# Patient Record
Sex: FEMALE | Race: NATIVE HAWAIIAN OR OTHER PACIFIC ISLANDER | NOT HISPANIC OR LATINO | Employment: FULL TIME | URBAN - METROPOLITAN AREA
[De-identification: names, ages, dates, MRNs, and addresses within clinical notes are randomized per-mention and may not be internally consistent; named-entity substitution may affect disease eponyms.]

---

## 2018-11-05 ENCOUNTER — APPOINTMENT (OUTPATIENT)
Dept: RADIOLOGY | Facility: MEDICAL CENTER | Age: 43
DRG: 812 | End: 2018-11-05
Attending: PHYSICAL MEDICINE & REHABILITATION
Payer: COMMERCIAL

## 2018-11-05 ENCOUNTER — HOSPITAL ENCOUNTER (INPATIENT)
Facility: MEDICAL CENTER | Age: 43
LOS: 2 days | DRG: 812 | End: 2018-11-07
Attending: EMERGENCY MEDICINE | Admitting: INTERNAL MEDICINE
Payer: COMMERCIAL

## 2018-11-05 DIAGNOSIS — N92.1 MENORRHAGIA WITH IRREGULAR CYCLE: ICD-10-CM

## 2018-11-05 DIAGNOSIS — D64.9 SYMPTOMATIC ANEMIA: ICD-10-CM

## 2018-11-05 LAB
ABO GROUP BLD: NORMAL
ABO GROUP BLD: NORMAL
ALBUMIN SERPL BCP-MCNC: 4 G/DL (ref 3.2–4.9)
ALBUMIN/GLOB SERPL: 1.5 G/DL
ALP SERPL-CCNC: 55 U/L (ref 30–99)
ALT SERPL-CCNC: 16 U/L (ref 2–50)
ANION GAP SERPL CALC-SCNC: 6 MMOL/L (ref 0–11.9)
ANISOCYTOSIS BLD QL SMEAR: ABNORMAL
APPEARANCE UR: ABNORMAL
AST SERPL-CCNC: 14 U/L (ref 12–45)
BACTERIA #/AREA URNS HPF: NEGATIVE /HPF
BACTERIA GENITAL QL WET PREP: NORMAL
BARCODED ABORH UBTYP: 5100
BARCODED ABORH UBTYP: 5100
BARCODED ABORH UBTYP: 9500
BARCODED PRD CODE UBPRD: NORMAL
BARCODED UNIT NUM UBUNT: NORMAL
BASOPHILS # BLD AUTO: 0.3 % (ref 0–1.8)
BASOPHILS # BLD: 0.02 K/UL (ref 0–0.12)
BILIRUB SERPL-MCNC: 0.3 MG/DL (ref 0.1–1.5)
BILIRUB UR QL STRIP.AUTO: NEGATIVE
BLD GP AB SCN SERPL QL: NORMAL
BUN SERPL-MCNC: 13 MG/DL (ref 8–22)
CALCIUM SERPL-MCNC: 8.6 MG/DL (ref 8.5–10.5)
CHLORIDE SERPL-SCNC: 105 MMOL/L (ref 96–112)
CO2 SERPL-SCNC: 27 MMOL/L (ref 20–33)
COLOR UR: YELLOW
COMMENT 1642: NORMAL
COMPONENT R 8504R: NORMAL
CREAT SERPL-MCNC: 0.72 MG/DL (ref 0.5–1.4)
EOSINOPHIL # BLD AUTO: 0.1 K/UL (ref 0–0.51)
EOSINOPHIL NFR BLD: 1.7 % (ref 0–6.9)
EPI CELLS #/AREA URNS HPF: NEGATIVE /HPF
ERYTHROCYTE [DISTWIDTH] IN BLOOD BY AUTOMATED COUNT: 44.1 FL (ref 35.9–50)
GIANT PLATELETS BLD QL SMEAR: NORMAL
GLOBULIN SER CALC-MCNC: 2.6 G/DL (ref 1.9–3.5)
GLUCOSE SERPL-MCNC: 115 MG/DL (ref 65–99)
GLUCOSE UR STRIP.AUTO-MCNC: NEGATIVE MG/DL
HCG SERPL QL: NEGATIVE
HCT VFR BLD AUTO: 17.6 % (ref 37–47)
HGB BLD-MCNC: 4.8 G/DL (ref 12–16)
HYALINE CASTS #/AREA URNS LPF: NORMAL /LPF
HYPOCHROMIA BLD QL SMEAR: ABNORMAL
IMM GRANULOCYTES # BLD AUTO: 0.05 K/UL (ref 0–0.11)
IMM GRANULOCYTES NFR BLD AUTO: 0.9 % (ref 0–0.9)
INR PPP: 1.08 (ref 0.87–1.13)
KETONES UR STRIP.AUTO-MCNC: NEGATIVE MG/DL
LEUKOCYTE ESTERASE UR QL STRIP.AUTO: ABNORMAL
LYMPHOCYTES # BLD AUTO: 1.49 K/UL (ref 1–4.8)
LYMPHOCYTES NFR BLD: 25.5 % (ref 22–41)
MACROCYTES BLD QL SMEAR: ABNORMAL
MCH RBC QN AUTO: 18.7 PG (ref 27–33)
MCHC RBC AUTO-ENTMCNC: 27.7 G/DL (ref 33.6–35)
MCV RBC AUTO: 67.6 FL (ref 81.4–97.8)
MICRO URNS: ABNORMAL
MICROCYTES BLD QL SMEAR: ABNORMAL
MONOCYTES # BLD AUTO: 0.57 K/UL (ref 0–0.85)
MONOCYTES NFR BLD AUTO: 9.8 % (ref 0–13.4)
MORPHOLOGY BLD-IMP: NORMAL
NEUTROPHILS # BLD AUTO: 3.61 K/UL (ref 2–7.15)
NEUTROPHILS NFR BLD: 61.8 % (ref 44–72)
NITRITE UR QL STRIP.AUTO: NEGATIVE
NRBC # BLD AUTO: 0.06 K/UL
NRBC BLD-RTO: 1 /100 WBC
OVALOCYTES BLD QL SMEAR: NORMAL
PH UR STRIP.AUTO: 8.5 [PH]
PLATELET # BLD AUTO: 257 K/UL (ref 164–446)
PLATELET BLD QL SMEAR: NORMAL
PMV BLD AUTO: 9.9 FL (ref 9–12.9)
POIKILOCYTOSIS BLD QL SMEAR: NORMAL
POLYCHROMASIA BLD QL SMEAR: NORMAL
POTASSIUM SERPL-SCNC: 3.3 MMOL/L (ref 3.6–5.5)
PRODUCT TYPE UPROD: NORMAL
PROT SERPL-MCNC: 6.6 G/DL (ref 6–8.2)
PROT UR QL STRIP: NEGATIVE MG/DL
PROTHROMBIN TIME: 14.1 SEC (ref 12–14.6)
RBC # BLD AUTO: 2.62 M/UL (ref 4.2–5.4)
RBC # URNS HPF: NORMAL /HPF
RBC BLD AUTO: PRESENT
RBC UR QL AUTO: NEGATIVE
RH BLD: NORMAL
RH BLD: NORMAL
SCHISTOCYTES BLD QL SMEAR: NORMAL
SIGNIFICANT IND 70042: NORMAL
SITE SITE: NORMAL
SODIUM SERPL-SCNC: 138 MMOL/L (ref 135–145)
SOURCE SOURCE: NORMAL
SP GR UR STRIP.AUTO: 1.02
UNIT STATUS USTAT: NORMAL
UROBILINOGEN UR STRIP.AUTO-MCNC: 0.2 MG/DL
WBC # BLD AUTO: 5.8 K/UL (ref 4.8–10.8)
WBC #/AREA URNS HPF: NORMAL /HPF

## 2018-11-05 PROCEDURE — 86901 BLOOD TYPING SEROLOGIC RH(D): CPT

## 2018-11-05 PROCEDURE — 30233N1 TRANSFUSION OF NONAUTOLOGOUS RED BLOOD CELLS INTO PERIPHERAL VEIN, PERCUTANEOUS APPROACH: ICD-10-PCS | Performed by: INTERNAL MEDICINE

## 2018-11-05 PROCEDURE — 87491 CHLMYD TRACH DNA AMP PROBE: CPT

## 2018-11-05 PROCEDURE — 700105 HCHG RX REV CODE 258: Performed by: EMERGENCY MEDICINE

## 2018-11-05 PROCEDURE — 93010 ELECTROCARDIOGRAM REPORT: CPT | Performed by: INTERNAL MEDICINE

## 2018-11-05 PROCEDURE — 93005 ELECTROCARDIOGRAM TRACING: CPT | Performed by: PHYSICAL MEDICINE & REHABILITATION

## 2018-11-05 PROCEDURE — 86900 BLOOD TYPING SEROLOGIC ABO: CPT

## 2018-11-05 PROCEDURE — 80053 COMPREHEN METABOLIC PANEL: CPT

## 2018-11-05 PROCEDURE — 81001 URINALYSIS AUTO W/SCOPE: CPT

## 2018-11-05 PROCEDURE — 85025 COMPLETE CBC W/AUTO DIFF WBC: CPT

## 2018-11-05 PROCEDURE — 84703 CHORIONIC GONADOTROPIN ASSAY: CPT

## 2018-11-05 PROCEDURE — 36430 TRANSFUSION BLD/BLD COMPNT: CPT

## 2018-11-05 PROCEDURE — 85610 PROTHROMBIN TIME: CPT

## 2018-11-05 PROCEDURE — 86923 COMPATIBILITY TEST ELECTRIC: CPT

## 2018-11-05 PROCEDURE — 87591 N.GONORRHOEAE DNA AMP PROB: CPT

## 2018-11-05 PROCEDURE — 94760 N-INVAS EAR/PLS OXIMETRY 1: CPT

## 2018-11-05 PROCEDURE — P9016 RBC LEUKOCYTES REDUCED: HCPCS

## 2018-11-05 PROCEDURE — 99285 EMERGENCY DEPT VISIT HI MDM: CPT

## 2018-11-05 PROCEDURE — 86850 RBC ANTIBODY SCREEN: CPT

## 2018-11-05 PROCEDURE — 770020 HCHG ROOM/CARE - TELE (206)

## 2018-11-05 RX ORDER — SODIUM CHLORIDE 9 MG/ML
INJECTION, SOLUTION INTRAVENOUS CONTINUOUS
Status: DISCONTINUED | OUTPATIENT
Start: 2018-11-05 | End: 2018-11-05

## 2018-11-05 RX ORDER — ACETAMINOPHEN 325 MG/1
650 TABLET ORAL EVERY 6 HOURS PRN
Status: DISCONTINUED | OUTPATIENT
Start: 2018-11-05 | End: 2018-11-07 | Stop reason: HOSPADM

## 2018-11-05 RX ORDER — BISACODYL 10 MG
10 SUPPOSITORY, RECTAL RECTAL
Status: DISCONTINUED | OUTPATIENT
Start: 2018-11-05 | End: 2018-11-07 | Stop reason: HOSPADM

## 2018-11-05 RX ORDER — GUAIFENESIN/DEXTROMETHORPHAN 100-10MG/5
10 SYRUP ORAL EVERY 6 HOURS PRN
Status: DISCONTINUED | OUTPATIENT
Start: 2018-11-05 | End: 2018-11-07 | Stop reason: HOSPADM

## 2018-11-05 RX ORDER — POLYETHYLENE GLYCOL 3350 17 G/17G
1 POWDER, FOR SOLUTION ORAL
Status: DISCONTINUED | OUTPATIENT
Start: 2018-11-05 | End: 2018-11-07 | Stop reason: HOSPADM

## 2018-11-05 RX ORDER — METRONIDAZOLE 500 MG/1
500 TABLET ORAL EVERY 12 HOURS
Status: DISCONTINUED | OUTPATIENT
Start: 2018-11-05 | End: 2018-11-06

## 2018-11-05 RX ORDER — AMOXICILLIN 250 MG
2 CAPSULE ORAL 2 TIMES DAILY
Status: DISCONTINUED | OUTPATIENT
Start: 2018-11-05 | End: 2018-11-07 | Stop reason: HOSPADM

## 2018-11-05 RX ADMIN — SODIUM CHLORIDE: 9 INJECTION, SOLUTION INTRAVENOUS at 20:15

## 2018-11-05 ASSESSMENT — ENCOUNTER SYMPTOMS
BLURRED VISION: 0
DIAPHORESIS: 0
HEMOPTYSIS: 0
PND: 0
PALPITATIONS: 0
FOCAL WEAKNESS: 0
SEIZURES: 0
HEADACHES: 0
DOUBLE VISION: 0
COUGH: 0
FEVER: 0
FALLS: 0
CONSTIPATION: 0
FOCAL WEAKNESS: 1
BRUISES/BLEEDS EASILY: 0
WEIGHT LOSS: 0
SHORTNESS OF BREATH: 1
CHILLS: 0
SHORTNESS OF BREATH: 0
LOSS OF CONSCIOUSNESS: 0
BLOOD IN STOOL: 0
SPEECH CHANGE: 0
WEAKNESS: 1
NAUSEA: 0
DIARRHEA: 0
TINGLING: 0
DIZZINESS: 0
WHEEZING: 0
CLAUDICATION: 0
SENSORY CHANGE: 0
VOMITING: 0
SPUTUM PRODUCTION: 0
ABDOMINAL PAIN: 0
ORTHOPNEA: 0
DEPRESSION: 0

## 2018-11-05 ASSESSMENT — LIFESTYLE VARIABLES
ON A TYPICAL DAY WHEN YOU DRINK ALCOHOL HOW MANY DRINKS DO YOU HAVE: 1
AVERAGE NUMBER OF DAYS PER WEEK YOU HAVE A DRINK CONTAINING ALCOHOL: 2
ALCOHOL_USE: YES
TOTAL SCORE: 0
TOTAL SCORE: 0
HAVE PEOPLE ANNOYED YOU BY CRITICIZING YOUR DRINKING: NO
CONSUMPTION TOTAL: NEGATIVE
TOTAL SCORE: 0
EVER_SMOKED: YES
EVER FELT BAD OR GUILTY ABOUT YOUR DRINKING: NO
EVER HAD A DRINK FIRST THING IN THE MORNING TO STEADY YOUR NERVES TO GET RID OF A HANGOVER: NO
HAVE YOU EVER FELT YOU SHOULD CUT DOWN ON YOUR DRINKING: NO
HOW MANY TIMES IN THE PAST YEAR HAVE YOU HAD 5 OR MORE DRINKS IN A DAY: 0
EVER_SMOKED: YES

## 2018-11-05 ASSESSMENT — COPD QUESTIONNAIRES
HAVE YOU SMOKED AT LEAST 100 CIGARETTES IN YOUR ENTIRE LIFE: YES
COPD SCREENING SCORE: 3
IN THE PAST 12 MONTHS DO YOU DO LESS THAN YOU USED TO BECAUSE OF YOUR BREATHING PROBLEMS: DISAGREE/UNSURE
HAVE YOU SMOKED AT LEAST 100 CIGARETTES IN YOUR ENTIRE LIFE: YES
DO YOU EVER COUGH UP ANY MUCUS OR PHLEGM?: NO/ONLY WITH OCCASIONAL COLDS OR INFECTIONS
COPD SCREENING SCORE: 3
DO YOU EVER COUGH UP ANY MUCUS OR PHLEGM?: NO/ONLY WITH OCCASIONAL COLDS OR INFECTIONS
DURING THE PAST 4 WEEKS HOW MUCH DID YOU FEEL SHORT OF BREATH: SOME OF THE TIME
DURING THE PAST 4 WEEKS HOW MUCH DID YOU FEEL SHORT OF BREATH: SOME OF THE TIME

## 2018-11-05 ASSESSMENT — COGNITIVE AND FUNCTIONAL STATUS - GENERAL
SUGGESTED CMS G CODE MODIFIER MOBILITY: CH
SUGGESTED CMS G CODE MODIFIER DAILY ACTIVITY: CH
DAILY ACTIVITIY SCORE: 24
MOBILITY SCORE: 24

## 2018-11-05 ASSESSMENT — PATIENT HEALTH QUESTIONNAIRE - PHQ9
1. LITTLE INTEREST OR PLEASURE IN DOING THINGS: NOT AT ALL
2. FEELING DOWN, DEPRESSED, IRRITABLE, OR HOPELESS: NOT AT ALL
SUM OF ALL RESPONSES TO PHQ9 QUESTIONS 1 AND 2: 0

## 2018-11-05 ASSESSMENT — PAIN SCALES - GENERAL
PAINLEVEL_OUTOF10: 0
PAINLEVEL_OUTOF10: 0

## 2018-11-05 NOTE — LETTER
[facility]  TELEMET[employer] [place] [address]F [facility] [place]  [address] [facility] [address]  [phone]     November 7, 2018    Patient: Sruthi Jenkins   YOB: 1975   Date of Visit: 11/5/2018       To Whom It May Concern:    Sruthi Jenkins was seen and treated in our department on 11/5/2018. She was discharged on 11/07/2018. She may return to work after few days rest on 11/14/2018.     Sincerely,     Kale Pierre M.D.

## 2018-11-06 ENCOUNTER — APPOINTMENT (OUTPATIENT)
Dept: RADIOLOGY | Facility: MEDICAL CENTER | Age: 43
DRG: 812 | End: 2018-11-06
Attending: PHYSICAL MEDICINE & REHABILITATION
Payer: COMMERCIAL

## 2018-11-06 ENCOUNTER — APPOINTMENT (OUTPATIENT)
Dept: RADIOLOGY | Facility: REHABILITATION | Age: 43
DRG: 812 | End: 2018-11-06
Attending: PHYSICAL MEDICINE & REHABILITATION
Payer: COMMERCIAL

## 2018-11-06 ENCOUNTER — APPOINTMENT (OUTPATIENT)
Dept: CARDIOLOGY | Facility: MEDICAL CENTER | Age: 43
DRG: 812 | End: 2018-11-06
Attending: STUDENT IN AN ORGANIZED HEALTH CARE EDUCATION/TRAINING PROGRAM
Payer: COMMERCIAL

## 2018-11-06 PROBLEM — E87.6 HYPOKALEMIA: Status: RESOLVED | Noted: 2018-11-06 | Resolved: 2018-11-06

## 2018-11-06 PROBLEM — E87.6 HYPOKALEMIA: Status: ACTIVE | Noted: 2018-11-06

## 2018-11-06 PROBLEM — N93.9 ABNORMAL UTERINE BLEEDING (AUB): Status: ACTIVE | Noted: 2018-11-06

## 2018-11-06 PROBLEM — A59.9 TRICHOMONAS VAGINALIS INFECTION: Status: ACTIVE | Noted: 2018-11-06

## 2018-11-06 PROBLEM — D64.9 ANEMIA: Status: ACTIVE | Noted: 2018-11-06

## 2018-11-06 LAB
ALBUMIN SERPL BCP-MCNC: 3.5 G/DL (ref 3.2–4.9)
ALBUMIN/GLOB SERPL: 1.5 G/DL
ALP SERPL-CCNC: 47 U/L (ref 30–99)
ALT SERPL-CCNC: 13 U/L (ref 2–50)
ANION GAP SERPL CALC-SCNC: 7 MMOL/L (ref 0–11.9)
AST SERPL-CCNC: 15 U/L (ref 12–45)
BASOPHILS # BLD AUTO: 0.6 % (ref 0–1.8)
BASOPHILS # BLD AUTO: 0.7 % (ref 0–1.8)
BASOPHILS # BLD AUTO: 0.9 % (ref 0–1.8)
BASOPHILS # BLD: 0.04 K/UL (ref 0–0.12)
BASOPHILS # BLD: 0.04 K/UL (ref 0–0.12)
BASOPHILS # BLD: 0.06 K/UL (ref 0–0.12)
BILIRUB SERPL-MCNC: 1.4 MG/DL (ref 0.1–1.5)
BNP SERPL-MCNC: 50 PG/ML (ref 0–100)
BUN SERPL-MCNC: 11 MG/DL (ref 8–22)
C TRACH DNA SPEC QL NAA+PROBE: NEGATIVE
CALCIUM SERPL-MCNC: 8 MG/DL (ref 8.5–10.5)
CHLORIDE SERPL-SCNC: 109 MMOL/L (ref 96–112)
CHOLEST SERPL-MCNC: 110 MG/DL (ref 100–199)
CO2 SERPL-SCNC: 23 MMOL/L (ref 20–33)
CREAT SERPL-MCNC: 0.65 MG/DL (ref 0.5–1.4)
EKG IMPRESSION: NORMAL
EOSINOPHIL # BLD AUTO: 0.08 K/UL (ref 0–0.51)
EOSINOPHIL # BLD AUTO: 0.1 K/UL (ref 0–0.51)
EOSINOPHIL # BLD AUTO: 0.14 K/UL (ref 0–0.51)
EOSINOPHIL NFR BLD: 1.4 % (ref 0–6.9)
EOSINOPHIL NFR BLD: 1.4 % (ref 0–6.9)
EOSINOPHIL NFR BLD: 2.1 % (ref 0–6.9)
ERYTHROCYTE [DISTWIDTH] IN BLOOD BY AUTOMATED COUNT: 50.1 FL (ref 35.9–50)
ERYTHROCYTE [DISTWIDTH] IN BLOOD BY AUTOMATED COUNT: 52.9 FL (ref 35.9–50)
ERYTHROCYTE [DISTWIDTH] IN BLOOD BY AUTOMATED COUNT: 58 FL (ref 35.9–50)
FERRITIN SERPL-MCNC: 5.1 NG/ML (ref 10–291)
GLOBULIN SER CALC-MCNC: 2.3 G/DL (ref 1.9–3.5)
GLUCOSE SERPL-MCNC: 107 MG/DL (ref 65–99)
HCT VFR BLD AUTO: 19 % (ref 37–47)
HCT VFR BLD AUTO: 21.6 % (ref 37–47)
HCT VFR BLD AUTO: 26.4 % (ref 37–47)
HDLC SERPL-MCNC: 41 MG/DL
HGB BLD-MCNC: 5.5 G/DL (ref 12–16)
HGB BLD-MCNC: 6.5 G/DL (ref 12–16)
HGB BLD-MCNC: 8.1 G/DL (ref 12–16)
HGB RETIC QN AUTO: 15.2 PG/CELL (ref 29–35)
IMM GRANULOCYTES # BLD AUTO: 0.04 K/UL (ref 0–0.11)
IMM GRANULOCYTES # BLD AUTO: 0.06 K/UL (ref 0–0.11)
IMM GRANULOCYTES # BLD AUTO: 0.08 K/UL (ref 0–0.11)
IMM GRANULOCYTES NFR BLD AUTO: 0.7 % (ref 0–0.9)
IMM GRANULOCYTES NFR BLD AUTO: 0.9 % (ref 0–0.9)
IMM GRANULOCYTES NFR BLD AUTO: 1.1 % (ref 0–0.9)
IMM RETICS NFR: 29.6 % (ref 9.3–17.4)
IRON SATN MFR SERPL: 62 % (ref 15–55)
IRON SERPL-MCNC: 268 UG/DL (ref 40–170)
LDH SERPL L TO P-CCNC: 168 U/L (ref 107–266)
LDLC SERPL CALC-MCNC: 59 MG/DL
LH SERPL-ACNC: 5 IU/L
LYMPHOCYTES # BLD AUTO: 1.42 K/UL (ref 1–4.8)
LYMPHOCYTES # BLD AUTO: 1.55 K/UL (ref 1–4.8)
LYMPHOCYTES # BLD AUTO: 1.66 K/UL (ref 1–4.8)
LYMPHOCYTES NFR BLD: 23.2 % (ref 22–41)
LYMPHOCYTES NFR BLD: 23.3 % (ref 22–41)
LYMPHOCYTES NFR BLD: 25.1 % (ref 22–41)
MAGNESIUM SERPL-MCNC: 1.9 MG/DL (ref 1.5–2.5)
MCH RBC QN AUTO: 20.4 PG (ref 27–33)
MCH RBC QN AUTO: 21.4 PG (ref 27–33)
MCH RBC QN AUTO: 22.5 PG (ref 27–33)
MCHC RBC AUTO-ENTMCNC: 28.9 G/DL (ref 33.6–35)
MCHC RBC AUTO-ENTMCNC: 30.1 G/DL (ref 33.6–35)
MCHC RBC AUTO-ENTMCNC: 30.7 G/DL (ref 33.6–35)
MCV RBC AUTO: 70.4 FL (ref 81.4–97.8)
MCV RBC AUTO: 71.1 FL (ref 81.4–97.8)
MCV RBC AUTO: 73.3 FL (ref 81.4–97.8)
MONOCYTES # BLD AUTO: 0.49 K/UL (ref 0–0.85)
MONOCYTES # BLD AUTO: 0.49 K/UL (ref 0–0.85)
MONOCYTES # BLD AUTO: 0.75 K/UL (ref 0–0.85)
MONOCYTES NFR BLD AUTO: 11.3 % (ref 0–13.4)
MONOCYTES NFR BLD AUTO: 6.8 % (ref 0–13.4)
MONOCYTES NFR BLD AUTO: 8.7 % (ref 0–13.4)
MORPHOLOGY BLD-IMP: NORMAL
N GONORRHOEA DNA SPEC QL NAA+PROBE: NEGATIVE
NEUTROPHILS # BLD AUTO: 3.58 K/UL (ref 2–7.15)
NEUTROPHILS # BLD AUTO: 4.08 K/UL (ref 2–7.15)
NEUTROPHILS # BLD AUTO: 4.09 K/UL (ref 2–7.15)
NEUTROPHILS NFR BLD: 61.5 % (ref 44–72)
NEUTROPHILS NFR BLD: 63.4 % (ref 44–72)
NEUTROPHILS NFR BLD: 66.9 % (ref 44–72)
NRBC # BLD AUTO: 0.05 K/UL
NRBC # BLD AUTO: 0.09 K/UL
NRBC # BLD AUTO: 0.11 K/UL
NRBC BLD-RTO: 0.9 /100 WBC
NRBC BLD-RTO: 1.3 /100 WBC
NRBC BLD-RTO: 1.7 /100 WBC
PLATELET # BLD AUTO: 212 K/UL (ref 164–446)
PLATELET # BLD AUTO: 227 K/UL (ref 164–446)
PLATELET # BLD AUTO: 233 K/UL (ref 164–446)
PMV BLD AUTO: 10 FL (ref 9–12.9)
PMV BLD AUTO: 10.2 FL (ref 9–12.9)
PMV BLD AUTO: 9.4 FL (ref 9–12.9)
POTASSIUM SERPL-SCNC: 3.7 MMOL/L (ref 3.6–5.5)
PROLACTIN SERPL-MCNC: 23.46 NG/ML (ref 2.8–26)
PROT SERPL-MCNC: 5.8 G/DL (ref 6–8.2)
RBC # BLD AUTO: 2.7 M/UL (ref 4.2–5.4)
RBC # BLD AUTO: 3.04 M/UL (ref 4.2–5.4)
RBC # BLD AUTO: 3.6 M/UL (ref 4.2–5.4)
RETICS # AUTO: 0.11 M/UL (ref 0.04–0.06)
RETICS/RBC NFR: 3.9 % (ref 0.8–2.1)
SODIUM SERPL-SCNC: 139 MMOL/L (ref 135–145)
SPECIMEN SOURCE: NORMAL
TIBC SERPL-MCNC: 433 UG/DL (ref 250–450)
TRIGL SERPL-MCNC: 51 MG/DL (ref 0–149)
TSH SERPL DL<=0.005 MIU/L-ACNC: 2.82 UIU/ML (ref 0.38–5.33)
WBC # BLD AUTO: 5.7 K/UL (ref 4.8–10.8)
WBC # BLD AUTO: 6.7 K/UL (ref 4.8–10.8)
WBC # BLD AUTO: 7.2 K/UL (ref 4.8–10.8)

## 2018-11-06 PROCEDURE — 83880 ASSAY OF NATRIURETIC PEPTIDE: CPT

## 2018-11-06 PROCEDURE — 85046 RETICYTE/HGB CONCENTRATE: CPT

## 2018-11-06 PROCEDURE — P9016 RBC LEUKOCYTES REDUCED: HCPCS

## 2018-11-06 PROCEDURE — 83550 IRON BINDING TEST: CPT

## 2018-11-06 PROCEDURE — 700111 HCHG RX REV CODE 636 W/ 250 OVERRIDE (IP): Performed by: STUDENT IN AN ORGANIZED HEALTH CARE EDUCATION/TRAINING PROGRAM

## 2018-11-06 PROCEDURE — 36415 COLL VENOUS BLD VENIPUNCTURE: CPT

## 2018-11-06 PROCEDURE — 83735 ASSAY OF MAGNESIUM: CPT

## 2018-11-06 PROCEDURE — 93306 TTE W/DOPPLER COMPLETE: CPT

## 2018-11-06 PROCEDURE — 83615 LACTATE (LD) (LDH) ENZYME: CPT

## 2018-11-06 PROCEDURE — 76856 US EXAM PELVIC COMPLETE: CPT

## 2018-11-06 PROCEDURE — 86923 COMPATIBILITY TEST ELECTRIC: CPT | Mod: 91

## 2018-11-06 PROCEDURE — 700105 HCHG RX REV CODE 258: Performed by: STUDENT IN AN ORGANIZED HEALTH CARE EDUCATION/TRAINING PROGRAM

## 2018-11-06 PROCEDURE — 700102 HCHG RX REV CODE 250 W/ 637 OVERRIDE(OP): Performed by: PHYSICAL MEDICINE & REHABILITATION

## 2018-11-06 PROCEDURE — 36430 TRANSFUSION BLD/BLD COMPNT: CPT

## 2018-11-06 PROCEDURE — 84443 ASSAY THYROID STIM HORMONE: CPT

## 2018-11-06 PROCEDURE — 82728 ASSAY OF FERRITIN: CPT

## 2018-11-06 PROCEDURE — 84146 ASSAY OF PROLACTIN: CPT

## 2018-11-06 PROCEDURE — A9270 NON-COVERED ITEM OR SERVICE: HCPCS | Performed by: PHYSICAL MEDICINE & REHABILITATION

## 2018-11-06 PROCEDURE — 770020 HCHG ROOM/CARE - TELE (206)

## 2018-11-06 PROCEDURE — 93306 TTE W/DOPPLER COMPLETE: CPT | Mod: 26 | Performed by: INTERNAL MEDICINE

## 2018-11-06 PROCEDURE — 80053 COMPREHEN METABOLIC PANEL: CPT

## 2018-11-06 PROCEDURE — 700102 HCHG RX REV CODE 250 W/ 637 OVERRIDE(OP): Performed by: STUDENT IN AN ORGANIZED HEALTH CARE EDUCATION/TRAINING PROGRAM

## 2018-11-06 PROCEDURE — 83002 ASSAY OF GONADOTROPIN (LH): CPT

## 2018-11-06 PROCEDURE — 99223 1ST HOSP IP/OBS HIGH 75: CPT | Mod: GC | Performed by: INTERNAL MEDICINE

## 2018-11-06 PROCEDURE — 83036 HEMOGLOBIN GLYCOSYLATED A1C: CPT

## 2018-11-06 PROCEDURE — 80061 LIPID PANEL: CPT

## 2018-11-06 PROCEDURE — A9270 NON-COVERED ITEM OR SERVICE: HCPCS | Performed by: STUDENT IN AN ORGANIZED HEALTH CARE EDUCATION/TRAINING PROGRAM

## 2018-11-06 PROCEDURE — 83540 ASSAY OF IRON: CPT

## 2018-11-06 PROCEDURE — 85025 COMPLETE CBC W/AUTO DIFF WBC: CPT

## 2018-11-06 RX ORDER — METRONIDAZOLE 500 MG/1
2000 TABLET ORAL ONCE
Status: COMPLETED | OUTPATIENT
Start: 2018-11-06 | End: 2018-11-06

## 2018-11-06 RX ORDER — FUROSEMIDE 10 MG/ML
20 INJECTION INTRAMUSCULAR; INTRAVENOUS
Status: COMPLETED | OUTPATIENT
Start: 2018-11-06 | End: 2018-11-06

## 2018-11-06 RX ORDER — CALCIUM CARBONATE 500 MG/1
500 TABLET, CHEWABLE ORAL DAILY
Status: DISCONTINUED | OUTPATIENT
Start: 2018-11-06 | End: 2018-11-07 | Stop reason: HOSPADM

## 2018-11-06 RX ORDER — SODIUM CHLORIDE 9 MG/ML
INJECTION, SOLUTION INTRAVENOUS PRN
Status: DISCONTINUED | OUTPATIENT
Start: 2018-11-06 | End: 2018-11-07 | Stop reason: HOSPADM

## 2018-11-06 RX ORDER — FUROSEMIDE 10 MG/ML
40 INJECTION INTRAMUSCULAR; INTRAVENOUS ONCE
Status: COMPLETED | OUTPATIENT
Start: 2018-11-06 | End: 2018-11-06

## 2018-11-06 RX ADMIN — FUROSEMIDE 20 MG: 10 INJECTION, SOLUTION INTRAMUSCULAR; INTRAVENOUS at 15:38

## 2018-11-06 RX ADMIN — FUROSEMIDE 40 MG: 10 INJECTION, SOLUTION INTRAMUSCULAR; INTRAVENOUS at 11:46

## 2018-11-06 RX ADMIN — ANTACID TABLETS 500 MG: 500 TABLET, CHEWABLE ORAL at 17:18

## 2018-11-06 RX ADMIN — METRONIDAZOLE 2000 MG: 500 TABLET ORAL at 13:19

## 2018-11-06 RX ADMIN — METRONIDAZOLE 500 MG: 500 TABLET ORAL at 05:38

## 2018-11-06 RX ADMIN — SODIUM CHLORIDE 1000 ML: 9 INJECTION, SOLUTION INTRAVENOUS at 11:51

## 2018-11-06 ASSESSMENT — ENCOUNTER SYMPTOMS
DEPRESSION: 0
ORTHOPNEA: 0
DIZZINESS: 0
TREMORS: 0
WEIGHT LOSS: 0
MEMORY LOSS: 0
SPUTUM PRODUCTION: 0
POLYDIPSIA: 0
HEADACHES: 0
PND: 0
FEVER: 0
BRUISES/BLEEDS EASILY: 0
SEIZURES: 0
FLANK PAIN: 0
NAUSEA: 0
DIAPHORESIS: 0
MYALGIAS: 0
NERVOUS/ANXIOUS: 0
COUGH: 0
HEARTBURN: 0
PALPITATIONS: 0
HEMOPTYSIS: 0
WHEEZING: 0
VOMITING: 0
ABDOMINAL PAIN: 0
SENSORY CHANGE: 0
CHILLS: 0
FOCAL WEAKNESS: 0
CONSTIPATION: 0
TINGLING: 0
WEAKNESS: 1
HALLUCINATIONS: 0
DIARRHEA: 0
BACK PAIN: 0
SPEECH CHANGE: 0
SHORTNESS OF BREATH: 0
NECK PAIN: 0
CLAUDICATION: 0
INSOMNIA: 0
EYES NEGATIVE: 1
FALLS: 0
LOSS OF CONSCIOUSNESS: 0
BLOOD IN STOOL: 0

## 2018-11-06 ASSESSMENT — PAIN SCALES - GENERAL
PAINLEVEL_OUTOF10: 0

## 2018-11-06 ASSESSMENT — LIFESTYLE VARIABLES: SUBSTANCE_ABUSE: 0

## 2018-11-06 NOTE — ED PROVIDER NOTES
ED Provider Note    Means of arrival: private vehicle  History obtained from: patient   History limited by: none    CHIEF COMPLAINT  Chief Complaint   Patient presents with   • Sent by MD   • Weakness     generalized weakness   • Abnormal Labs     H&H 5&16       HPI  Sruthi Jenkins is a 43 y.o. female who presents to the Emergency Department for weakness and anemia.  Patient reports that she is a G5, P5 with her youngest child being 13 years old.  Over the last month she has had a menstrual cycle where she was bleeding very heavily in for prolonged when compared to previously.  Prior to this last month she had normal periods.  Patient reports that she stopped bleeding a few days ago but has still had persistent generalized weakness and fatigue.  Last PAP smear was 11 years ago and was normal.  Patient has no history of instrumentation to the uterus.  She is otherwise healthy.  She went to urgent care where she was found to have a hemoglobin of around 5 and sent here for further evaluation.    REVIEW OF SYSTEMS  Review of Systems   Constitutional: Positive for malaise/fatigue. Negative for chills and fever.   Respiratory: Negative for shortness of breath.    Cardiovascular: Negative for chest pain.   Gastrointestinal: Negative for abdominal pain, nausea and vomiting.   Neurological: Positive for focal weakness.       PAST MEDICAL HISTORY   None    SURGICAL HISTORY  patient denies any surgical history    SOCIAL HISTORY  Social History   Substance Use Topics   • Smoking status: Never Smoker   • Smokeless tobacco: Never Used   • Alcohol use No      History   Drug Use No       FAMILY HISTORY  History reviewed. No pertinent family history.    CURRENT MEDICATIONS  Home Medications     Reviewed by Brandy Diaz R.N. (Registered Nurse) on 11/05/18 at 2044  Med List Status: Partial   Medication Last Dose Status   IRON PO 11/5/2018 Active                ALLERGIES  No Known Allergies    PHYSICAL EXAM  VITAL SIGNS: BP  "124/77   Pulse 98   Temp 36.8 °C (98.3 °F) (Temporal)   Resp (!) 26   Ht 1.626 m (5' 4\")   Wt 107.5 kg (236 lb 15.9 oz)   LMP  (Within Days)   SpO2 100%   BMI 40.68 kg/m²   Vitals reviewed by myself.  Physical Exam   Constitutional: She is oriented to person, place, and time and well-developed, well-nourished, and in no distress. No distress.   HENT:   Head: Normocephalic and atraumatic.   Eyes: EOM are normal.   + for conjunctiva pallor   Neck: Normal range of motion.   Cardiovascular: Normal rate, regular rhythm and normal heart sounds.  Exam reveals no gallop and no friction rub.    No murmur heard.  Pulmonary/Chest: Effort normal and breath sounds normal. No respiratory distress. She has no wheezes.   Abdominal: Soft. She exhibits no distension. There is no tenderness. There is no rebound.   Musculoskeletal: Normal range of motion.   Neurological: She is alert and oriented to person, place, and time.         DIAGNOSTIC STUDIES /  LABS  Labs Reviewed   CBC WITH DIFFERENTIAL - Abnormal; Notable for the following:        Result Value    RBC 2.62 (*)     Hemoglobin 4.8 (*)     Hematocrit 17.6 (*)     MCV 67.6 (*)     MCH 18.7 (*)     MCHC 27.7 (*)     All other components within normal limits    Narrative:     Indicate which anticoagulants the patient is on:->NONE   COMP METABOLIC PANEL - Abnormal; Notable for the following:     Potassium 3.3 (*)     Glucose 115 (*)     All other components within normal limits    Narrative:     Indicate which anticoagulants the patient is on:->NONE   COD (ADULT)   PROTHROMBIN TIME    Narrative:     Indicate which anticoagulants the patient is on:->NONE   HCG QUAL SERUM   ABO AND RH CONFIRMATION   ESTIMATED GFR    Narrative:     Indicate which anticoagulants the patient is on:->NONE   PERIPHERAL SMEAR REVIEW    Narrative:     Indicate which anticoagulants the patient is on:->NONE   PLATELET ESTIMATE    Narrative:     Indicate which anticoagulants the patient is on:->NONE "   MORPHOLOGY    Narrative:     Indicate which anticoagulants the patient is on:->NONE   DIFFERENTIAL COMMENT    Narrative:     Indicate which anticoagulants the patient is on:->NONE   TRANSFUSE RED BLOOD CELLS-NURSING COMMUNICATION       All labs reviewed by me.      COURSE & MEDICAL DECISION MAKING  Nursing notes, VS, PMSFHx reviewed in chart.    Patient is a 43-year-old female who comes in for fatigue and generalized weakness after menorrhagia times 1 month.  Menorrhagia has now resolved.  Patient was found to have a hemoglobin of approximately 5 at urgent care.  Therefore I ordered a CBC and type and screen to assess her hemoglobin.      Patient's initial vitals are notable for slight tachycardia. She is otherwise hemodynamically stable, she is pale but well-appearing.  Hemoglobin returns and is 4.8, therefore patient will be transfused and admitted for further hemoglobin monitoring.  She is not having any active bleeding, therefore I have advised her to follow-up with gynecologist outpatient for further workup of her menorrhagia.  Patient is agreeable to this plan.  I discussed the case with [facility] internal medicine who has accepted the admission.  At time of admission patient is in guarded condition.    DISPO: Admit in guarded condition    FINAL IMPRESSION  1. Symptomatic anemia    2. Menorrhagia with irregular cycle

## 2018-11-06 NOTE — ASSESSMENT & PLAN NOTE
42 yo obese F  all carried to term and delivered naturally with no PMH presenting for severe anemia with Hb 4.8 with associated weakness, fatigue, pallor, and tachycardia.  - September menses very heavy 3 weeks long.    - Pelvic exam did not reveal any masses, lesions, or active bleeding. Pregnancy test negative.   - Pt states there is no family hx of heavy menses or bleeding disorders.  She has not been to a physician for years. MCV 67. No fibroids palpated on exam. Pt is on no meds and has never used hormones.   -Reticulocyte index 0.7 shows hyperproliferation  -Patient received 3 units packed red blood cells and 40 mg IV Lasix followed by 20 mg IV Lasix after the third unit.  Plan  -Transvaginal and pelvic US pending  -Fe studies pending   -LDH, BNP, TSH pending   -EKG pending  -Monitor H&H, vital  -Transfuse until Hb >7  -Will likely need to start Fe transfusions  -Consider CT abdomen/pelvis if all of the above are unremarkable.   -Consider Gyn consult

## 2018-11-06 NOTE — SENIOR ADMIT NOTE
Senior Admit Note    Patient: Sruthi Jenkins.  MRN: [id number].                               Chief complaint: fatigue, dizziness    HPI:  In brief, Ms. Jenkins is a 43 y female with no known PMHx, does not have regular PCP, presenting for fatigue and dizziness. Patient reports heavy vaginal bleeding that lasted for three weeks, then stopped one week ago. Patient states today she felt very fatigued, dizzy. She went to urgent care and was found to have hemoglobin of 5. She was directed to ED where hemoglobin found to be 4.8. She was slightly tachycardic HR , tachypneic 20's, BP stable. She received 1 unit PRBCs in the ED, and is hemodynamically stable. Patient reports menstrual periods are usually regular every 30 days, lasting about 3 days with heavy flow and clots, however the last two months her periods extended to over 1 week, and last month lasted for 3 weeks with moderate flow and blood clots. She has had 5 vaginal deliveries, no miscarriages, last delivery 13 years ago. She denies being sexually active with  in the room, denies hx of STI's. Denies any C-sections or pelvic surgeries. Denies any easy bruising or bleeding. She does not take any medications except daily vitamin. She denies abdominal pain, pelvic pain, chest pain, palpitations, shortness of breath, fever, chills.      Objective:  Pertinent vitals/physical findings: T 98.3, , RR 16, /77, 97% RA  Gen.: lying in bed, NAD.  HEENT: Normocephalic, nontraumatic.  MMM.  No icterus. Pale eyelids.  CV: RRR, No m/r/g.  No JVD.  No carotid bruits.  Lungs: CTAB.  No crackles, wheezing.    Abd: soft, NT/ND, no rebound, bowel sounds present.   Extremities: No lower extremity edema. Distal pulses intact.  : normal bimanual exam, no adenopathy or masses, no bleeding in vaginal vault or from cervical os, no friable material or inflammatory changes  Neuro: Alert and oriented to person, place, time, situation.    Skin: warm, dry.   Psych:  appropriate mood, affect.      Labs:  Hgb/Hct 4.8/17.6, MCV 67.6, K 3.3, CBC and CMP otherwise unremarkable  Wet prep positive for trichomonas    Imaging:  Pelvic and transvaginal US ordered.    Assessment/plan for main hospital problem:    # Microcytic anemia  # Abnormal uterine bleeding  # Trichomonas infection  # Hypokalemia    Severe anemia likely from blood loss anemia given recent heavy bleeding.  No signs of active bleeding currently, continue to monitor.  Received one unit PRBCs in ED, recheck Hgb/Hct, transfuse to goal >7.  Pelvic exam normal, will order pelvic and transvaginal US to assess for structural abnormalities. Coagulopathy and PCOS lower on differential.  Iron studies for anemia, I suspect patient will need iron supplementation.  Flagyl for trichomonas, patient reports not being sexually active with . GC, chlamydia swab pending.      DVT prophylaxis: SCDs  Code status: FULL    For complete details, please refer to H&P by intern, Dr. Downs.     Henrik Bal M.D.

## 2018-11-06 NOTE — PROGRESS NOTES
Bedside report received, pt care assumed. Pt AOx4, safety precautions in place, call bell in reach. Blood is running through left AC 18g IV, no complications noted. Instructed pt to call for assistance if needed.

## 2018-11-06 NOTE — H&P
..        Internal Medicine Admitting History and Physical    Note Author: Melissa Downs M.D.       Name Sruthi Jenkins       1975   Age/Sex 43 y.o. female   MRN [id number]   Code Status Full     After 5PM or if no immediate response to page, please call for cross-coverage  Attending/Team: ARIAN Orr/Lucy See Patient List for primary contact information  Call [phone] to page    1st Call - Day Intern (R1):   Dr. Mayes 2nd Call - Day Sr. Resident (R2/R3):   Dr. Pierre       Chief Complaint:   Weakness/severe anemia    HPI:  Sruthi Jenkins is a very pleasant 42 yo F  all carried to term and delivered naturally with no PMH presenting for weakness/severe anemia. She felt very weak today, stopped by an Urgent Care, and was sent to [facility] after being found to have a Hb of 5. Pt reports her menses have been abnormal since September. Usually, pt's periods commence around the 1st of every month, last for 3 days, require approximately 4 pads/day, have clots the size of quarters, and she takes Midol for 1-2 days but says the cramping does not bother her that much. In Sept, pt had a period for 1.5 weeks and it was much lighter than usual. In October, pt had a very heavy period and bled for three weeks. The first two weeks were excessively heavy and the last week of the period was still heavy but her flow was lighter in comparison to the first two weeks. Her clots increased in size to the size of half dollars and she was using up to 7 ultra pads/day and constantly soaking through them. As of today, she has not bled for over a week. She has not seen an OB-GYN physician for at least 11 years. She did not have prenatal follow up during the births of any of her children and reports no pregnancy, birth, or post-partum complications. Her children range in age from 13-23 years. Pt denies any hs of endometriosis or fibroids. Pt denies family hx of bleeding/anemia disorders, fibroids, or cancer although she states  her sister used to have very heavy periods. Pt reports she has not been sexually after since at least September and she is only active with her . She has never had any STD's. Pt states she has gained at least 10 lbs this month and her extremities are very swollen. Pt reports her baseline weight is in the 180's lbs (current measurement is 236 lbs). She endorses recent hair loss,sensitivity to the cold, and appearing much paler than usual. She denies feeling dizziness/syncope/night sweats/ easy bruising/HA/vision changes/coughing/fever/chills/nausea/vomiting/constipation/diarrhea/blood in the stools or urine. Pt smoked <1pack/week for 6-7 years when she was younger and denies EtOH or drug use. She denies any surgery or taking any medications. No contraceptive use. No radiation hx. Last pap smear was at least 11 years ago.     Upon presentation to the ED, pt had tachycardia 115. Remaining vitals stable. T 36.8C, RR 16, /77, SaO2 97% RA    ED Labs demonstrated negative pregnancy test, blood type O+,  and the following:  H&H 4.8/17.6  MCV 67.6  MCH 18.7  MCHC 27.7  RDW WNL    WBC: 5.8  Plts 257  MPV: WNL    BMP Na138, K+ 3.3, Cl 105, HCO3 27, BUN 13, Cr 0.72    PT: 14.1  INR 1.08    Peripheral smear:  +1 Hypochromia  +2 Anisocytosis  +1 Macrocytosis  +2 Microcytosis      UA:   PH 8.5 otherwise unremarkable    Wet prep:   Many WBC  Few clue cells  Moderate motile Trichomonas  No yeast    Retic count, Fe panel, FOBT, TSH, lipid panel, a1c, LDH, transvaginal and pelvic US, pending     Pt received 1 unit pRBC before midnight.       Review of Systems   Constitutional: Positive for malaise/fatigue. Negative for chills, diaphoresis, fever and weight loss.   HENT: Negative for nosebleeds and tinnitus.         No gingival bleeding   Eyes: Negative for blurred vision and double vision.        Pale mucous membranes    Respiratory: Positive for shortness of breath. Negative for cough, hemoptysis, sputum production and  wheezing.    Cardiovascular: Positive for leg swelling. Negative for chest pain, palpitations, orthopnea, claudication and PND.   Gastrointestinal: Negative for abdominal pain, blood in stool, constipation, diarrhea, melena, nausea and vomiting.   Genitourinary: Negative for dysuria, frequency, hematuria and urgency.   Musculoskeletal: Negative for falls.   Skin: Negative for itching and rash.   Neurological: Positive for weakness. Negative for dizziness, tingling, sensory change, speech change, focal weakness, seizures, loss of consciousness and headaches.   Endo/Heme/Allergies: Does not bruise/bleed easily.   Psychiatric/Behavioral: Negative for depression.             Past Medical History (Chronic medical problem, known complications and current treatment)    ..History reviewed. No pertinent past medical history.      Past Surgical History:  History reviewed. No pertinent surgical history.    Current Outpatient Medications:  Home Medications     Reviewed by Pita Wu R.N. (Registered Nurse) on 11/05/18 at 2304  Med List Status: Complete   Medication Last Dose Status   IRON PO 11/5/2018 Active   multivitamin (THERAGRAN) Tab 11/5/2018 Active                Medication Allergy/Sensitivities:  No Known Allergies      Family History (mandatory)   History reviewed. No pertinent family history.    Social History (mandatory)   5 kids ages 13-23  Social History     Social History   • Marital status: Single     Spouse name: N/A   • Number of children: N/A   • Years of education: N/A     Occupational History   • Not on file.     Social History Main Topics   • Smoking status: Never Smoker   • Smokeless tobacco: Never Used   • Alcohol use No   • Drug use: No   • Sexual activity: Not on file     Other Topics Concern   • Not on file     Social History Narrative   • No narrative on file     Living situation: Lives with  and kids  PCP : No primary care provider on file.Pt does not have PCP    Physical Exam  "    Vitals:    11/05/18 2246 11/05/18 2258 11/05/18 2300 11/05/18 2315   BP: 106/70   105/67   Pulse: 92 93  91   Resp: 20 20  20   Temp: 36.7 °C (98 °F)   36.7 °C (98.1 °F)   TempSrc:       SpO2: 99% 100%  100%   Weight:   105.9 kg (233 lb 7.5 oz)    Height:         Body mass index is 40.07 kg/m².  /67   Pulse 91   Temp 36.7 °C (98.1 °F)   Resp 20   Ht 1.626 m (5' 4\")   Wt 105.9 kg (233 lb 7.5 oz)   LMP 11/01/2018   SpO2 100%   Breastfeeding? No   BMI 40.07 kg/m²   O2 therapy: Pulse Oximetry: 100 %, O2 (LPM): 0, O2 Delivery: None (Room Air)    Physical Exam   Constitutional: She is oriented to person, place, and time.   Very pale, no petechiae. No gingivial bleeding, no cervical or inguinal lympahdenopathy. Pt in no distress.     HENT:   Head: Normocephalic and atraumatic.   No glossitis or angular cheilosis    Eyes: Pupils are equal, round, and reactive to light. No scleral icterus.   Pale mucus membranes    Neck: No JVD present.   Cardiovascular: Normal rate, regular rhythm, normal heart sounds and intact distal pulses.  Exam reveals no gallop and no friction rub.    No murmur heard.  Strong radial pulses.    Pulmonary/Chest: Effort normal and breath sounds normal. No stridor. No respiratory distress. She has no wheezes. She has no rales.   Abdominal: Soft. Bowel sounds are normal. She exhibits no distension. There is no tenderness. There is no rebound and no guarding.   Genitourinary:   Genitourinary Comments: No deformities of labia majora  No masses or lesions palpated or observed. No bleeding. Copious white discharge. Mild fishy odor. No cherry red cervix. No cervical motion tenderness. No lichen sclerosis.     Rectal exam deferred.    Musculoskeletal: She exhibits no tenderness.   Nonpitting bilateral lower extremity edema   Neurological: She is alert and oriented to person, place, and time. No cranial nerve deficit.   Skin: Skin is warm and dry. No rash noted. No pallor.   No jaundice, no " spider angiomas, no erythema nodosum   Psychiatric: Mood, memory, affect and judgment normal.   Nursing note and vitals reviewed.        Data Review       Old Records Request:   Completed  Current Records review/summary: Completed    Lab Data Review:  Recent Results (from the past 24 hour(s))   CBC WITH DIFFERENTIAL    Collection Time: 11/05/18  7:15 PM   Result Value Ref Range    WBC 5.8 4.8 - 10.8 K/uL    RBC 2.62 (L) 4.20 - 5.40 M/uL    Hemoglobin 4.8 (LL) 12.0 - 16.0 g/dL    Hematocrit 17.6 (LL) 37.0 - 47.0 %    MCV 67.6 (L) 81.4 - 97.8 fL    MCH 18.7 (L) 27.0 - 33.0 pg    MCHC 27.7 (L) 33.6 - 35.0 g/dL    RDW 44.1 35.9 - 50.0 fL    Platelet Count 257 164 - 446 K/uL    MPV 9.9 9.0 - 12.9 fL    Nucleated RBC 1.00 /100 WBC    NRBC (Absolute) 0.06 K/uL    Neutrophils-Polys 61.80 44.00 - 72.00 %    Lymphocytes 25.50 22.00 - 41.00 %    Monocytes 9.80 0.00 - 13.40 %    Eosinophils 1.70 0.00 - 6.90 %    Basophils 0.30 0.00 - 1.80 %    Immature Granulocytes 0.90 0.00 - 0.90 %    Neutrophils (Absolute) 3.61 2.00 - 7.15 K/uL    Lymphs (Absolute) 1.49 1.00 - 4.80 K/uL    Monos (Absolute) 0.57 0.00 - 0.85 K/uL    Eos (Absolute) 0.10 0.00 - 0.51 K/uL    Baso (Absolute) 0.02 0.00 - 0.12 K/uL    Immature Granulocytes (abs) 0.05 0.00 - 0.11 K/uL    Hypochromia 1+     Anisocytosis 2+     Macrocytosis 1+     Microcytosis 2+    CMP    Collection Time: 11/05/18  7:15 PM   Result Value Ref Range    Sodium 138 135 - 145 mmol/L    Potassium 3.3 (L) 3.6 - 5.5 mmol/L    Chloride 105 96 - 112 mmol/L    Co2 27 20 - 33 mmol/L    Anion Gap 6.0 0.0 - 11.9    Glucose 115 (H) 65 - 99 mg/dL    Bun 13 8 - 22 mg/dL    Creatinine 0.72 0.50 - 1.40 mg/dL    Calcium 8.6 8.5 - 10.5 mg/dL    AST(SGOT) 14 12 - 45 U/L    ALT(SGPT) 16 2 - 50 U/L    Alkaline Phosphatase 55 30 - 99 U/L    Total Bilirubin 0.3 0.1 - 1.5 mg/dL    Albumin 4.0 3.2 - 4.9 g/dL    Total Protein 6.6 6.0 - 8.2 g/dL    Globulin 2.6 1.9 - 3.5 g/dL    A-G Ratio 1.5 g/dL   COD (ADULT)     Collection Time: 11/05/18  7:15 PM   Result Value Ref Range    ABO Grouping Only O     Rh Grouping Only POS     Antibody Screen-Cod NEG     Component R       R3                  Red Blood Cells3    [id number]   transfused   11/05/18   21:19      Product Type Red Blood Cells LR Pheresis     Dispense Status Transfused     Unit Number (Barcoded) [id number]     Product Code (Barcoded) U8113V51     Blood Type (Barcoded) 9500    PT/INR    Collection Time: 11/05/18  7:15 PM   Result Value Ref Range    PT 14.1 12.0 - 14.6 sec    INR 1.08 0.87 - 1.13   BETA-HCG QUALITATIVE SERUM    Collection Time: 11/05/18  7:15 PM   Result Value Ref Range    Beta-Hcg Qualitative Serum Negative Negative   ESTIMATED GFR    Collection Time: 11/05/18  7:15 PM   Result Value Ref Range    GFR If African American >60 >60 mL/min/1.73 m 2    GFR If Non African American >60 >60 mL/min/1.73 m 2   PERIPHERAL SMEAR REVIEW    Collection Time: 11/05/18  7:15 PM   Result Value Ref Range    Peripheral Smear Review see below    PLATELET ESTIMATE    Collection Time: 11/05/18  7:15 PM   Result Value Ref Range    Plt Estimation Normal    MORPHOLOGY    Collection Time: 11/05/18  7:15 PM   Result Value Ref Range    RBC Morphology Present     Giant Platelets 2+     Polychromia 1+     Poikilocytosis 1+     Ovalocytes 1+     Schistocytes 1+    DIFFERENTIAL COMMENT    Collection Time: 11/05/18  7:15 PM   Result Value Ref Range    Comments-Diff see below    ABO AND RH CONFIRMATION    Collection Time: 11/05/18  8:18 PM   Result Value Ref Range    ABO Confirm O     Second Rh Group POS    Urinalysis    Collection Time: 11/05/18  9:47 PM   Result Value Ref Range    Color Yellow     Character Turbid (A)     Specific Gravity 1.019 <1.035    Ph 8.5 (A) 5.0 - 8.0    Glucose Negative Negative mg/dL    Ketones Negative Negative mg/dL    Protein Negative Negative mg/dL    Bilirubin Negative Negative    Urobilinogen, Urine 0.2 Negative    Nitrite Negative Negative     Leukocyte Esterase Trace (A) Negative    Occult Blood Negative Negative    Micro Urine Req Microscopic    WET PREP    Collection Time: 18  9:47 PM   Result Value Ref Range    Significant Indicator NEG     Source GEN     Site CERVICAL     Wet Prep For Parasites       Many WBCs seen.  Few clue cells seen.  Moderate motile Trichomonas seen.  No yeast.     CHLAMYDIA/GC PCR URINE OR SWAB    Collection Time: 18  9:47 PM   Result Value Ref Range    Source VAGINAL    URINE MICROSCOPIC (W/UA)    Collection Time: 18  9:47 PM   Result Value Ref Range    WBC 0-2 /hpf    RBC 0-2 /hpf    Bacteria Negative None /hpf    Epithelial Cells Negative /hpf    Hyaline Cast 0-2 /lpf   EKG    Collection Time: 18 11:57 PM   Result Value Ref Range    Report       [facility]    Test Date:  2018  Pt Name:    HEMA GARNER               Department: ER  MRN:        [id number]                      Room:       [facility]  Gender:     Female                       Technician: SVITLANA  :        1975                   Requested By:MAXIMILIANO VEGA  Order #:    [id number]                    Reading MD:    Measurements  Intervals                                Axis  Rate:       89                           P:          39  WA:         128                          QRS:        40  QRSD:       76                           T:          31  QT:         388  QTc:        473    Interpretive Statements  SINUS RHYTHM  EARLY PRECORDIAL R/S TRANSITION  No previous ECG available for comparison         Imaging/Procedures Review:    Independant Imaging Review: Completed  US-PELVIC COMPLETE (TRANSABDOMINAL/TRANSVAGINAL) (COMBO)    (Results Pending)            EKG:   Pending     Records reviewed and summarized in current documentation :  Yes  [employer] teaching service handout given to patient:  No         Assessment/Plan     * Anemia- (present on admission)   Assessment & Plan    44 yo obese F  all carried to term and delivered naturally  with no PMH presenting for severe anemia with Hb 4.8 with associated weakness, fatigue, pallor, and tachycardia. Pt reports she recently had a very heavy 3 week long period but was not symptomatic at that time. Pelvic exam did not reveal any masses, lesions, or active bleeding. Pregnancy test negative. Pt states there is no family hx of heavy menses or bleeding disorders.  She has not been to a physician for years. MCV 67. No fibroids palpated on exam. Pt is on no meds and has never used hormones. Etiology unclear at this time. Periods previously normal prior to Sept. Consider malignancy vs PCOS, vs endometriosis vs fibroids vs thyroid disease vs hyperprolactinemia. Less likely vWD given late presentation of bleeding.Less likely AI hemolytic anemia given presentation. BP stable. Mild tachycardia. Pt is O+. No hx of GI bleed. Rectal exam deferred. No hx NSAID use. No glossitis or angular cheilosis No hx of night sweats/fever/chills/unintentional weight loss. No evidence of liver or kidney disease.   -Transvaginal and pelvic US pending  -Fe studies pending   -Reticulocyte count pending   -FOBT pending, rectal examine deferred  -LDH, BNP, TSH pending   -EKG pending  -Monitor H&H.   -Monitor Vitals   -Transfuse until Hb >7  -Will likely need to start Fe transfusions  -Consider CT abdomen/pelvis if all of the above are unremarkable.   -Consider Gyn consult     Hypokalemia- (present on admission)   Assessment & Plan    Monitor  Replete     Trichomonas vaginalis infection- (present on admission)   Assessment & Plan    Moderate trichomonas seen on wet prep. No greenish discharge. No strawberry cervix. No complaints of dysuria. Pt reports she has not head sex for months.   -Flagyl x 7 days  -Treat partner         Anticipated Hospital stay:  >2 midnights        Quality Measures  Quality-Core Measures   Reviewed items::  Medications reviewed, Labs reviewed, Radiology images reviewed and EKG reviewed  Cross catheter::  No  Cross  DVT prophylaxis pharmacological::  Contraindicated - Anemia requiring blood transfusion  DVT prophylaxis - mechanical:  SCDs  Ulcer Prophylaxis::  No    PCP: No primary care provider on file.

## 2018-11-06 NOTE — NON-PROVIDER
Internal Medicine Medical Student Note  Note Author: Brett Soto, Student    Name Sruthi Jenkins       1975   Age/Sex 43 y.o. female   MRN [id number]   Code Status FULL             Reason for interval visit  (Principal Problem)   Anemia    Interval Problem Daily Status Update  (problem status, last 24 hours, new history, new data )   Patient is a 44 yo  obese female with recent menorrhagia presenting with generalized weakness and anemia. Patient had an abnormal 3 week long heavy bleeding menses in October that ended one week ago. Patient began feeling generally weak and went into urgent care. Her Hgb was 5 and she was sent to the ED.     No acute overnight events. Patient reports her strength is not 100% back to baseline, but feels much improved compared to yesterday. Patient reports she still feels weak, but she has noticed improvement in her strength. She tolerated her breakfast and liquids well today. Patient denies dizziness, syncope, chest pain, SOB, abdominal pain, paresthesias. Patient reported that she is no longer bleeding. Patient feels well overall and has no concerns at this time. Patient is willing to f/u outpatient for her anemia and GYN. Patient reports weight gain 10 lbs this month with lower extremity swelling.       Physical Exam       Vitals:    18 0400 18 0500 18 0530 18 0750   BP: 102/68 103/70 102/71 105/71   Pulse: 92 93 90 88   Resp: 17 20 20 19   Temp: 37.6 °C (99.6 °F) 37.1 °C (98.7 °F) 37.1 °C (98.7 °F) 36.5 °C (97.7 °F)   TempSrc:       SpO2: 95% 95% 98% 97%   Weight:       Height:         Body mass index is 40.07 kg/m². Weight: 105.9 kg (233 lb 7.5 oz)  Oxygen Therapy:  Pulse Oximetry: 97 %, O2 (LPM): 0, O2 Delivery: None (Room Air)    Physical Exam   Constitutional: She is oriented to person, place, and time and well-developed, well-nourished, and in no distress.   Patient appears lethargic and pale.    HENT:   Head: Normocephalic and atraumatic.    Eyes: Conjunctivae and EOM are normal. Right eye exhibits no discharge. Left eye exhibits no discharge. No scleral icterus.   Neck: Normal range of motion.   Cardiovascular: Normal rate, regular rhythm and normal heart sounds.  Exam reveals no gallop and no friction rub.    No murmur heard.  Pulmonary/Chest: Effort normal and breath sounds normal. No respiratory distress. She has no wheezes. She has no rales. She exhibits no tenderness.   Abdominal: Soft. Bowel sounds are normal. She exhibits no distension and no mass. There is no tenderness. There is no rebound and no guarding.   Musculoskeletal: Normal range of motion. She exhibits edema.   Swelling noted in leg.    Lymphadenopathy:     She has no cervical adenopathy.   Neurological: She is alert and oriented to person, place, and time. No cranial nerve deficit. GCS score is 15.   Skin: Skin is warm and dry. No rash noted. No erythema. There is pallor.   Areas of dark skin noted in waist and stretch marks.      Labs:   CBC at admission: RBC 2.62, Hgb 4.8, HCT 17.6, MCV 67.6    CBC after one pRBC: RBC 2.7, Hgb 5.5, HCT 19, MCV 70.4    CBC after second pRBC: RBC 3.04, Hgb 6.5, HCT 21.6, MCV 71.1    CMP: WNL except Glu 107, Calcium 8.0, Total Prot 5.8    Iron: 268 (high)  TIBC: 433 (high normal)  %Sat: 62 (high)  Ferritin: 5.1 (low)    BNP 50    TSH: 2.82  Prolactin: 23.46    LH: 5    ECG: SINUS RHYTHM   EARLY PRECORDIAL R/S TRANSITION     TVUS: pending.     Assessment/Plan     Patient is an obese 42 yo female with recent irregular menses presenting with generalized weakness and anemia. Patient reports last menses was abnormal with heavy bleeding for 3 weeks. It stopped 1 week ago, but patient has felt weak since. Patient is pale with conjunctival pallor. Hgb was 4.8 on presentation and has been steadily climbing with packed RBC transfusions. It is now at 6.5. Patient's overall picture is consistent iron deficiency anemia secondary to heavy bleeding during her last  period. Patient has not seen a PCP or GYN in a while. Patient will need to be evaluated for compliance as she will need f/u for the cause of her abnormal bleeding if it persists.     # ANEMIA - Patient has had protracted menses in October with abnormally heavy flow. Patient's Hgb was 4.8 on admission and has steadily risen to 6.5 post 2 packed RBC transfusions. MCV has been around 68-70. Patient's Iron study reveals high levels of iron, high/normal TIBC/% saturation and low ferritin. Low ferritin indicates that the patient's overall iron stores are low. Although patient's blood iron levels are high, this may be due to her receiving oral iron at the ED prior to the study. Overall this is still consistent with an iron deficiency anemia picture potentially with elevated blood iron levels due to recent administration. This is consistent with the patient's history.     - Hgb 6.5 currently. Goal above Hgb 7.   - Patient's vitals have remained stable. BP stable. HR elevated, but not tachycardic. NS infusion PRN.   - Patient has received 2 pRBC transfusions. Administer 1/3 pRBC tomorrow.   - Lasix IV 20 mg.   - Transferrin pending.  - FOBT pending.   - Trend CBC.     # GENERALIZED WEAKNESS - Patient reported generalized weakness on admission. Given her history and documented severe anemia, this is likely secondary to her anemia. Patient reports improvement with packed RBC transfusions.     - Continue monitor clinical symptoms of weakness.   - Patient on regular diet and tolerating well.   - Fall Precaution.  - PO/OT evaluation for walking.   - Improvement of symptoms expected with rising Hgb.     # ABNORMAL UTERINE BLEEDING - Patient reports she has had 2 month history of abnormal uterine bleeding. Patient reports regular menses before September that consists of bleeding for 3 days with 4 pads/day. In September, patient had lighter bleeding for 1.5 weeks. In October patient had heavy flow with 7 ultra pads/day for 3 weeks.  Bleeding has since stopped and she is not currently bleeding. Etiology of abnormal bleeding currently unknown. Patient reports hair loss, 10lb weight gain this month and extremity swelling which are concerning for underlying endocrine syndrome like Cushing's or hypothyroid.     - Ddx: structural abnormalities such as uterine fibroids, polyps, adenomyosis, coagulopathy, ovarian dysfunction including perimenopausal symptoms, endometrial thickening, cannot rule out malignancy. Other include PCOS and Cushing's syndrome. TSH normal and thyroid dysfunction unlikely.   - TVUS pending!!  - FSH pending. LH was 5. Patient reports her menses ended 1 week ago, which would put her at Follicular phase in a normal cycle. Normal range Follicular 2.4-12.6, Mid-cycle 14-95.6, Luteal 1-11.4.   - Prolactin 23.45 normal.   - TSH 2.82 WNL.   - PT/INR: 14.1/1.08 WNL  - B-hcg neg     # HYPOCALCEMIA - Patient had normal calcium levels on admission, but has calcium of 8 today.     - Calcium carbonate with food.     # HEART MURMUR - murmur noted on heart exam. Patient asymptomatic.     - echocardiogram ordered. Pending.     # TRICHOMONAS VAGINALIS INFECTION - Patient shown to have motile trichomonas on wet prep.     - Metronidazole 2 g one dose.   - Patient's partner will need to be treated.

## 2018-11-06 NOTE — PROGRESS NOTES
Per lKaci in Laboratory chlamydia/gonorrhea swab is missing from specimen. Informed Dr Mayes who states okay to run chlamydia/gonorrhea off of urine specimen that is already in lab.

## 2018-11-06 NOTE — PROGRESS NOTES
Lab called with critical result of hemoglobin 5.5. Critical lab result read back.   Dr. Downs notified of critical lab result at 04:43.  Critical lab result read back by Dr. Downs.

## 2018-11-06 NOTE — ED NOTES
Med rec completed per pt.   Antibiotics within last 30 days: No  Patient allergies have been reviewed: KARELY

## 2018-11-06 NOTE — DIETARY
NUTRITION SERVICES: BMI - Pt with BMI >40 (=40.07). Weight loss counseling not appropriate in acute care setting. RECOMMEND - Referral to outpatient nutrition services for weight management after D/C.

## 2018-11-06 NOTE — PROGRESS NOTES
Internal Medicine Interval Note  Note Author: Rodriguez Mayes M.D.     Name Sruthi Jenkins 1975   Age/Sex 43 y.o. female   MRN [id number]   Code Status Full Code     After 5PM or if no immediate response to page, please call for cross-coverage  Attending/Team: Dr. Ayala/Kirby Team See Patient List for primary contact information  Call [phone] to page    1st Call - Day Intern (R1):   Dr. Kalyan Mayes 2nd Call - Day Sr. Resident (R2/R3):   Dr. Kale Pierre         Reason for interval visit  (Principal Problem)   Severe anemia      Interval Problem Daily Status Update  (24 hours, problem oriented, brief subjective history, new lab/imaging data pertinent to that problem)     The patient is a 43-year-old female  with 5 normal vaginal deliveries, who presents with severe anemia and hemoglobin of 4.8 in urgent care facility.  The patient was transferred to [facility], where her hemoglobin was found to be 5.  The patient reported in September her menses was extremely light, followed by a menses in October which was extremely heavy and lasted 3 weeks.  Subsequently she was feeling weak and tired and went to urgent care for evaluation and was found to have severe anemia.  She denies any family history of fibroids or cancer.  Wet prep in the ED showed positive for trichomonas and the patient was treated with 2 g Flagyl p.o. Once.  The patient's reticulocyte index was 0.7 showing a hyperproliferative state.    No acute events overnight.  The patient received 2 units of packed red blood cells and her hemoglobin donal to 6.5.  1/3 unit of packed red blood cells was low ordered.  The patient also received 60 mg of IV Lasix during transfusion.  The patient denied any symptoms and complaints.  She endorsed feeling better.    The patient denies chest pain, shortness of breath, abdominal pain, dyspareunia, headaches, and changes in vision.  Patient is endorsing weight gain 10 pounds this month with lower  extremity swelling.  She reports her baseline weight is 80s but currently weighs 236 pounds.  TSH was normal.    A transvaginal ultrasound was ordered and is pending.        Review of Systems   Constitutional: Positive for malaise/fatigue. Negative for chills, diaphoresis, fever and weight loss.   HENT: Negative.    Eyes: Negative.    Respiratory: Negative for cough, hemoptysis, sputum production, shortness of breath and wheezing.    Cardiovascular: Positive for leg swelling. Negative for chest pain, palpitations, orthopnea, claudication and PND.   Gastrointestinal: Negative for abdominal pain, blood in stool, constipation, diarrhea, heartburn, melena, nausea and vomiting.   Genitourinary: Negative for dysuria, flank pain, frequency, hematuria and urgency.   Musculoskeletal: Negative for back pain, falls, joint pain, myalgias and neck pain.   Skin: Negative for itching and rash.   Neurological: Positive for weakness. Negative for dizziness, tingling, tremors, sensory change, speech change, focal weakness, seizures, loss of consciousness and headaches.   Endo/Heme/Allergies: Negative for environmental allergies and polydipsia. Does not bruise/bleed easily.   Psychiatric/Behavioral: Negative for depression, hallucinations, memory loss, substance abuse and suicidal ideas. The patient is not nervous/anxious and does not have insomnia.        Disposition/Barriers to discharge:   None    Consultants/Specialty  None    PCP: No primary care provider on file.      Quality Measures  Quality-Core Measures   Reviewed items::  Labs reviewed, Medications reviewed and Radiology images reviewed  Cross catheter::  No Cross  DVT prophylaxis pharmacological::  Contraindicated - Anemia requiring blood transfusion and Contraindicated - High bleeding risk  DVT prophylaxis - mechanical:  SCDs  Ulcer Prophylaxis::  Yes          Physical Exam       Vitals:    11/06/18 1200 11/06/18 1215 11/06/18 1230 11/06/18 1328   BP: 104/55 116/80 115/66  117/69   Pulse: 93 97 93 97   Resp: 18 19 19 19   Temp: 36.9 °C (98.5 °F) 36.9 °C (98.5 °F) 36.2 °C (97.1 °F) 36.2 °C (97.1 °F)   TempSrc:       SpO2: 95% 97% 98% 98%   Weight:       Height:         Body mass index is 40.07 kg/m². Weight: 105.9 kg (233 lb 7.5 oz)  Oxygen Therapy:  Pulse Oximetry: 98 %, O2 (LPM): 0, O2 Delivery: None (Room Air)    Physical Exam   Constitutional: She is oriented to person, place, and time. No distress.   HENT:   Head: Normocephalic and atraumatic.   Mouth/Throat: Oropharynx is clear and moist. No oropharyngeal exudate.   Eyes: Pupils are equal, round, and reactive to light. Conjunctivae and EOM are normal. Right eye exhibits no discharge. Left eye exhibits no discharge. No scleral icterus.   Neck: Normal range of motion. Neck supple. No JVD present. No tracheal deviation present. No thyromegaly present.   Cardiovascular: Normal rate, regular rhythm and intact distal pulses.  Exam reveals no gallop and no friction rub.    Murmur heard.  Pulmonary/Chest: Effort normal and breath sounds normal. No stridor. No respiratory distress. She has no wheezes. She has no rales. She exhibits no tenderness.   Abdominal: Soft. Bowel sounds are normal. She exhibits no distension and no mass. There is no tenderness. There is no rebound and no guarding.   Genitourinary: Vaginal discharge found.   Musculoskeletal: Normal range of motion. She exhibits edema. She exhibits no tenderness or deformity.   Lymphadenopathy:     She has no cervical adenopathy.   Neurological: She is alert and oriented to person, place, and time. She displays normal reflexes. No cranial nerve deficit. She exhibits normal muscle tone. Coordination normal. GCS score is 15.   Skin: Skin is warm and dry. No rash noted. She is not diaphoretic. No erythema. No pallor.   Psychiatric: Mood, memory, affect and judgment normal.             Assessment/Plan     * Anemia- (present on admission)   Assessment & Plan    42 yo obese F  all  carried to term and delivered naturally with no PMH presenting for severe anemia with Hb 4.8 with associated weakness, fatigue, pallor, and tachycardia.  - September menses very heavy 3 weeks long.    - Pelvic exam did not reveal any masses, lesions, or active bleeding. Pregnancy test negative.   - Pt states there is no family hx of heavy menses or bleeding disorders.  She has not been to a physician for years. MCV 67. No fibroids palpated on exam. Pt is on no meds and has never used hormones.   -Reticulocyte index 0.7 shows hyperproliferation  -Patient received 3 units packed red blood cells and 40 mg IV Lasix followed by 20 mg IV Lasix after the third unit.  Plan  -Transvaginal and pelvic US pending  -Fe studies pending   -LDH, BNP, TSH pending   -EKG pending  -Monitor H&H, vital  -Transfuse until Hb >7  -Will likely need to start Fe transfusions  -Consider CT abdomen/pelvis if all of the above are unremarkable.   -Consider Gyn consult     Abnormal uterine bleeding (AUB)   Assessment & Plan    Etiology unclear at this time.  Leiomyomatous, adenomyosis, endometrial polyps, ovulatory dysfunction, arteriovenous malformation, endometrial cancer.  - Periods previously normal prior to Sept. Consider malignancy vs PCOS, vs endometriosis vs fibroids vs thyroid disease vs hyperprolactinemia. Less likely vWD given late presentation of bleeding.Less likely AI hemolytic anemia given presentation. BP stable. Mild tachycardia. Pt is O+. No hx of GI bleed. Rectal exam deferred. No hx NSAID use. No glossitis or angular cheilosis No hx of night sweats/fever/chills/unintentional weight loss. No evidence of liver or kidney disease.   -Prolactin 23, TSH within normal limits  -Patient denies any symptoms of changes in vision.  -Darkening abdominal history of the abdomen reported over the last month.  Plan  -LH, FSH, morning cortisol pending  -Consider endocrinology referral     Trichomonas vaginalis infection- (present on admission)    Assessment & Plan    Moderate trichomonas seen on wet prep. No greenish discharge. No strawberry cervix. No complaints of dysuria. Pt reports she has not head sex for months.   Plan  -Metronidazole 2 g oral once  -Treat partner

## 2018-11-06 NOTE — ED TRIAGE NOTES
".  Chief Complaint   Patient presents with   • Sent by MD   • Weakness     generalized weakness   • Abnormal Labs     H&H 5&16     ./77   Pulse (!) 115   Temp 36.8 °C (98.3 °F) (Temporal)   Resp 16   Ht 1.626 m (5' 4\")   Wt 107.5 kg (236 lb 15.9 oz)   LMP  (Within Days)   SpO2 97%   BMI 40.68 kg/m²     Ambulatory to triage with above complaints, patient pale, tachycardic, denies dizziness, educated on triage process, placed in lobby, told to inform staff of any changes in condition.    "

## 2018-11-06 NOTE — CARE PLAN
Problem: Venous Thromboembolism (VTW)/Deep Vein Thrombosis (DVT) Prevention:  Goal: Patient will participate in Venous Thrombosis (VTE)/Deep Vein Thrombosis (DVT)Prevention Measures  Outcome: PROGRESSING AS EXPECTED  Pt is contraidicated for DVT prophylazis via medication due to recent vaginal bleeding and low H/H. Pt declined scds at this time. Educated pt on DVT prophylaxis, pt verbalized understanding.     Problem: Knowledge Deficit  Goal: Knowledge of disease process/condition, treatment plan, diagnostic tests, and medications will improve  Outcome: PROGRESSING AS EXPECTED  Educated pt on POC which includes US of abdomen, blood transfusions, and H/H monitoring. Pt verbalized understanding.

## 2018-11-06 NOTE — ASSESSMENT & PLAN NOTE
Etiology unclear at this time.  Leiomyomatous, adenomyosis, endometrial polyps, ovulatory dysfunction, arteriovenous malformation, endometrial cancer.  - Periods previously normal prior to Sept. Consider malignancy vs PCOS, vs endometriosis vs fibroids vs thyroid disease vs hyperprolactinemia. Less likely vWD given late presentation of bleeding.Less likely AI hemolytic anemia given presentation. BP stable. Mild tachycardia. Pt is O+. No hx of GI bleed. Rectal exam deferred. No hx NSAID use. No glossitis or angular cheilosis No hx of night sweats/fever/chills/unintentional weight loss. No evidence of liver or kidney disease.   -Prolactin 23, TSH within normal limits  -Patient denies any symptoms of changes in vision.  -Darkening abdominal history of the abdomen reported over the last month.  Plan  -LH, FSH, morning cortisol pending  -Consider endocrinology referral

## 2018-11-06 NOTE — CARE PLAN
Problem: Safety  Goal: Will remain free from injury  Outcome: PROGRESSING AS EXPECTED  Pt alert and oriented, oriented to unit, verbalizes understanding of call bell and will call for assistance to bathroom    Problem: Fluid Volume:  Goal: Will maintain balanced intake and output  Outcome: PROGRESSING AS EXPECTED

## 2018-11-06 NOTE — ASSESSMENT & PLAN NOTE
Moderate trichomonas seen on wet prep. No greenish discharge. No strawberry cervix. No complaints of dysuria. Pt reports she has not head sex for months.   Plan  -Metronidazole 2 g oral once  -Treat partner

## 2018-11-06 NOTE — ED NOTES
bedside handoff report given to nurse for pt being admitted to 733. Blood continues to infuse on admission.

## 2018-11-06 NOTE — PROGRESS NOTES
Pt arrived to unit, blood transfusion, no s/s transfusion reaction, admission vitals WNL, family updated of POC will continue to monitor.

## 2018-11-07 ENCOUNTER — PATIENT OUTREACH (OUTPATIENT)
Dept: HEALTH INFORMATION MANAGEMENT | Facility: OTHER | Age: 43
End: 2018-11-07

## 2018-11-07 VITALS
BODY MASS INDEX: 38.39 KG/M2 | RESPIRATION RATE: 18 BRPM | WEIGHT: 224.87 LBS | OXYGEN SATURATION: 97 % | SYSTOLIC BLOOD PRESSURE: 115 MMHG | HEIGHT: 64 IN | DIASTOLIC BLOOD PRESSURE: 69 MMHG | HEART RATE: 93 BPM | TEMPERATURE: 98.2 F

## 2018-11-07 LAB
ALBUMIN SERPL BCP-MCNC: 4 G/DL (ref 3.2–4.9)
ALBUMIN/GLOB SERPL: 1.4 G/DL
ALP SERPL-CCNC: 63 U/L (ref 30–99)
ALT SERPL-CCNC: 16 U/L (ref 2–50)
ANION GAP SERPL CALC-SCNC: 7 MMOL/L (ref 0–11.9)
AST SERPL-CCNC: 16 U/L (ref 12–45)
BASOPHILS # BLD AUTO: 0.9 % (ref 0–1.8)
BASOPHILS # BLD: 0.07 K/UL (ref 0–0.12)
BILIRUB SERPL-MCNC: 0.4 MG/DL (ref 0.1–1.5)
BUN SERPL-MCNC: 16 MG/DL (ref 8–22)
CALCIUM SERPL-MCNC: 8.9 MG/DL (ref 8.5–10.5)
CHLORIDE SERPL-SCNC: 107 MMOL/L (ref 96–112)
CO2 SERPL-SCNC: 25 MMOL/L (ref 20–33)
CORTIS SERPL-MCNC: 7.9 UG/DL (ref 0–23)
CREAT SERPL-MCNC: 0.73 MG/DL (ref 0.5–1.4)
EOSINOPHIL # BLD AUTO: 0.15 K/UL (ref 0–0.51)
EOSINOPHIL NFR BLD: 1.9 % (ref 0–6.9)
ERYTHROCYTE [DISTWIDTH] IN BLOOD BY AUTOMATED COUNT: 59.8 FL (ref 35.9–50)
EST. AVERAGE GLUCOSE BLD GHB EST-MCNC: 131 MG/DL
FSH SERPL-ACNC: 2.9 MIU/ML
GLOBULIN SER CALC-MCNC: 2.9 G/DL (ref 1.9–3.5)
GLUCOSE SERPL-MCNC: 112 MG/DL (ref 65–99)
HBA1C MFR BLD: 6.2 % (ref 0–5.6)
HCT VFR BLD AUTO: 28.8 % (ref 37–47)
HGB BLD-MCNC: 8.7 G/DL (ref 12–16)
IMM GRANULOCYTES # BLD AUTO: 0.09 K/UL (ref 0–0.11)
IMM GRANULOCYTES NFR BLD AUTO: 1.2 % (ref 0–0.9)
LH SERPL-ACNC: 6 IU/L
LV EJECT FRACT  99904: 65
LV EJECT FRACT MOD 2C 99903: 67.39
LV EJECT FRACT MOD 4C 99902: 69.33
LV EJECT FRACT MOD BP 99901: 67.55
LYMPHOCYTES # BLD AUTO: 1.95 K/UL (ref 1–4.8)
LYMPHOCYTES NFR BLD: 25.1 % (ref 22–41)
MCH RBC QN AUTO: 22.3 PG (ref 27–33)
MCHC RBC AUTO-ENTMCNC: 30.2 G/DL (ref 33.6–35)
MCV RBC AUTO: 73.8 FL (ref 81.4–97.8)
MONOCYTES # BLD AUTO: 0.75 K/UL (ref 0–0.85)
MONOCYTES NFR BLD AUTO: 9.6 % (ref 0–13.4)
NEUTROPHILS # BLD AUTO: 4.77 K/UL (ref 2–7.15)
NEUTROPHILS NFR BLD: 61.3 % (ref 44–72)
NRBC # BLD AUTO: 0.04 K/UL
NRBC BLD-RTO: 0.5 /100 WBC
PLATELET # BLD AUTO: 214 K/UL (ref 164–446)
PMV BLD AUTO: 9.4 FL (ref 9–12.9)
POTASSIUM SERPL-SCNC: 3.7 MMOL/L (ref 3.6–5.5)
PROT SERPL-MCNC: 6.9 G/DL (ref 6–8.2)
RBC # BLD AUTO: 3.9 M/UL (ref 4.2–5.4)
SODIUM SERPL-SCNC: 139 MMOL/L (ref 135–145)
TRANSFERRIN SERPL-MCNC: 383 MG/DL (ref 200–370)
WBC # BLD AUTO: 7.8 K/UL (ref 4.8–10.8)

## 2018-11-07 PROCEDURE — 99239 HOSP IP/OBS DSCHRG MGMT >30: CPT | Mod: GC | Performed by: INTERNAL MEDICINE

## 2018-11-07 PROCEDURE — 700102 HCHG RX REV CODE 250 W/ 637 OVERRIDE(OP): Performed by: STUDENT IN AN ORGANIZED HEALTH CARE EDUCATION/TRAINING PROGRAM

## 2018-11-07 PROCEDURE — 83002 ASSAY OF GONADOTROPIN (LH): CPT

## 2018-11-07 PROCEDURE — 80053 COMPREHEN METABOLIC PANEL: CPT

## 2018-11-07 PROCEDURE — 700111 HCHG RX REV CODE 636 W/ 250 OVERRIDE (IP): Performed by: INTERNAL MEDICINE

## 2018-11-07 PROCEDURE — 97535 SELF CARE MNGMENT TRAINING: CPT

## 2018-11-07 PROCEDURE — 85025 COMPLETE CBC W/AUTO DIFF WBC: CPT

## 2018-11-07 PROCEDURE — 700111 HCHG RX REV CODE 636 W/ 250 OVERRIDE (IP): Performed by: HOSPITALIST

## 2018-11-07 PROCEDURE — A9270 NON-COVERED ITEM OR SERVICE: HCPCS | Performed by: STUDENT IN AN ORGANIZED HEALTH CARE EDUCATION/TRAINING PROGRAM

## 2018-11-07 PROCEDURE — 700102 HCHG RX REV CODE 250 W/ 637 OVERRIDE(OP): Performed by: PHYSICAL MEDICINE & REHABILITATION

## 2018-11-07 PROCEDURE — 3E02340 INTRODUCTION OF INFLUENZA VACCINE INTO MUSCLE, PERCUTANEOUS APPROACH: ICD-10-PCS | Performed by: INTERNAL MEDICINE

## 2018-11-07 PROCEDURE — A9270 NON-COVERED ITEM OR SERVICE: HCPCS | Performed by: PHYSICAL MEDICINE & REHABILITATION

## 2018-11-07 PROCEDURE — 90686 IIV4 VACC NO PRSV 0.5 ML IM: CPT | Performed by: INTERNAL MEDICINE

## 2018-11-07 PROCEDURE — 84466 ASSAY OF TRANSFERRIN: CPT

## 2018-11-07 PROCEDURE — 83001 ASSAY OF GONADOTROPIN (FSH): CPT

## 2018-11-07 PROCEDURE — 90471 IMMUNIZATION ADMIN: CPT

## 2018-11-07 PROCEDURE — 36415 COLL VENOUS BLD VENIPUNCTURE: CPT

## 2018-11-07 PROCEDURE — 82533 TOTAL CORTISOL: CPT

## 2018-11-07 RX ORDER — ASCORBIC ACID 500 MG
500 TABLET ORAL DAILY
Qty: 90 TAB | Refills: 2 | Status: ON HOLD | OUTPATIENT
Start: 2018-11-07 | End: 2023-03-31

## 2018-11-07 RX ORDER — METRONIDAZOLE 500 MG/1
2000 TABLET ORAL DAILY
Qty: 8 TAB | Refills: 0 | Status: SHIPPED | OUTPATIENT
Start: 2018-11-07 | End: 2018-11-09

## 2018-11-07 RX ORDER — FERROUS SULFATE 325(65) MG
325 TABLET ORAL DAILY
Qty: 90 TAB | Refills: 2 | Status: SHIPPED | OUTPATIENT
Start: 2018-11-07 | End: 2022-01-20 | Stop reason: SDUPTHER

## 2018-11-07 RX ADMIN — INFLUENZA A VIRUS A/MICHIGAN/45/2015 X-275 (H1N1) ANTIGEN (FORMALDEHYDE INACTIVATED), INFLUENZA A VIRUS A/HONG KONG/4801/2014 X-263B (H3N2) ANTIGEN (FORMALDEHYDE INACTIVATED), INFLUENZA B VIRUS B/PHUKET/3073/2013 ANTIGEN (FORMALDEHYDE INACTIVATED), AND INFLUENZA B VIRUS B/BRISBANE/60/2008 ANTIGEN (FORMALDEHYDE INACTIVATED) 0.5 ML: 15; 15; 15; 15 INJECTION, SUSPENSION INTRAMUSCULAR at 16:00

## 2018-11-07 RX ADMIN — IRON SUCROSE 200 MG: 20 INJECTION, SOLUTION INTRAVENOUS at 09:20

## 2018-11-07 RX ADMIN — ANTACID TABLETS 500 MG: 500 TABLET, CHEWABLE ORAL at 09:38

## 2018-11-07 RX ADMIN — ACETAMINOPHEN 650 MG: 325 TABLET, FILM COATED ORAL at 09:38

## 2018-11-07 ASSESSMENT — PAIN SCALES - GENERAL
PAINLEVEL_OUTOF10: 0
PAINLEVEL_OUTOF10: 5
PAINLEVEL_OUTOF10: 0

## 2018-11-07 NOTE — PROGRESS NOTES
Bedside report with RN Ciera, pt is alert and oriented on room air, call bell within reach, up independently, no questions or needs expressed at this time, will continue to monitor.

## 2018-11-07 NOTE — NON-PROVIDER
Internal Medicine Medical Student Note  Note Author: Brett Soto, Student    Name Sruthi Jenkins 1975   Age/Sex 43 y.o. female   MRN [id number]   Code Status FULL             Reason for interval visit  (Principal Problem)   Anemia    Interval Problem Daily Status Update  (problem status, last 24 hours, new history, new data )   Patient is a 43 year old female presenting with generalized weakness and anemia after an abnormal menstrual period in October in which she menstruated for 3 weeks with heavy flow. Patient also reports 50lb weight gain over 4-6 months, hair loss, sensitivity to cold, and swollen extremities. Patient has been receiving packed RBC and H/H has been steadily climbing.    No acute overnight events. Patient has received total of 3 units of pRBC transfusions. Patient feels well today and says she feels better compared to yesterday. Patient reports she has not walked around much, because she does not want to, but was encouraged to ambulate more today. Patient denies headache, visual changes, light headedness, dizziness, chest pain, SOB, abdominal pain, paresthesias today. Patient denies any more vaginal bleeding right now. Patient tearfully talked to me about her current situation with her . They have not slept together in months and have not had sexual intercourse in that time as well. Patient was unable to tell me in detail the cause of her anxiety, but reported she does not feel like herself lately and that she feels a lot of stress/anxiety about her marital problems. Patient also reports that for the past 4 months, she has increased the amount of desserts she has been eating significantly due to her stressors at home and believes this may be contributing to her weight gain. Patient reports no issues with urination or bowel movements. Patient requests a note to be excused from work and reports she would like to take a month off from work to recover.         Physical Exam        Vitals:    11/07/18 0000 11/07/18 0330 11/07/18 0744 11/07/18 1200   BP: 120/60 106/65 109/70 115/69   Pulse: 90 89 86 93   Resp: 18 18 18 18   Temp: 36.7 °C (98.1 °F) 36.3 °C (97.3 °F) 36.9 °C (98.5 °F) 36.8 °C (98.2 °F)   TempSrc:       SpO2: 99% 96% 94% 97%   Weight:  102 kg (224 lb 13.9 oz)     Height:         Body mass index is 38.6 kg/m². Weight: 102 kg (224 lb 13.9 oz)  Oxygen Therapy:  Pulse Oximetry: 97 %, O2 (LPM): 0, O2 Delivery: None (Room Air)    Physical Exam   Constitutional: She is oriented to person, place, and time and well-developed, well-nourished, and in no distress.   HENT:   Head: Normocephalic and atraumatic.   Eyes: Conjunctivae and EOM are normal. Right eye exhibits no discharge. Left eye exhibits no discharge. No scleral icterus.   Neck: Normal range of motion.   Cardiovascular: Normal rate, regular rhythm, normal heart sounds and intact distal pulses.  Exam reveals no gallop and no friction rub.    No murmur heard.  Pulmonary/Chest: Effort normal and breath sounds normal. No respiratory distress. She has no wheezes. She has no rales. She exhibits no tenderness.   Abdominal: Soft. Bowel sounds are normal. She exhibits no distension and no mass. There is no tenderness. There is no rebound and no guarding.   Musculoskeletal: Normal range of motion. She exhibits no edema, tenderness or deformity.   Neurological: She is alert and oriented to person, place, and time. No cranial nerve deficit. GCS score is 15.   Skin: Skin is warm and dry. No rash noted. She is not diaphoretic. No erythema. No pallor.   Psychiatric: Memory and judgment normal.   Patient appears emotionally distressed. Patient is tearful when talking about her .      Labs:    CBC: WNL except RBC 3.9, Hgb 8.7, HCT 28.8, MCV 73.8, MCH 22.3, MCHC 30.2, RDW 59.8.   CMP: WNL except Glu 112    Transferrin 383  FSH: 2.9  LH: 6.0  LDH: 168    Retic: 3.9  Abs retic: 0.11  Immat retic: 29.6  Retic Hgb Eq: 15.2  RPI calculated:  1.8    TVUS:  Impressions:  Thickened endometrial stripe. This can be seen in the setting of hyperplasia, polyps, with malignancy not excluded.    3.3 x 3.24 x 2.81 cm vaginal cyst with internal debris. This could represent a Bartholin's or Diana duct cyst.    Nabothian cysts in the cervix.    3.42 cm right ovarian cyst. Follow-up pelvic ultrasound in 6 weeks in a different phase of the patient's menstrual cycle is recommended.    Small amount of free fluid in the pelvis.    Echocardiogram:  Impressions   Normal left ventricular chamber size. Normal left ventricular wall   thickness. Normal left ventricular systolic function. Left ventricular   ejection fraction is visually estimated to be 65%. Normal regional wall   motion. Normal diastolic function.    Assessment/Plan     Patient is an obese 44 yo female with recent irregular menses presenting with generalized weakness and anemia. Patient reports last menses was abnormal with heavy bleeding for 3 weeks. It stopped 1 week ago, but patient has felt weak since. Patient is pale with conjunctival pallor. Hgb was 4.8 on presentation and has been steadily climbing with packed RBC transfusions. It is now at 6.5. Patient's overall picture is consistent iron deficiency anemia secondary to heavy bleeding during her last period. Patient has not seen a PCP or GYN in a while. Patient will need to be evaluated for compliance as she will need f/u for the cause of her abnormal bleeding if it persists.      # ANEMIA - Patient has had protracted menses in October with abnormally heavy flow. Patient's Hgb was 4.8 on admission and has steadily risen to 6.5 post 2 packed RBC transfusions. MCV has been around 68-70. Patient's Iron study reveals high levels of iron, high/normal TIBC/% saturation and low ferritin. Low ferritin indicates that the patient's overall iron stores are low. Although patient's blood iron levels are high, this may be due to her receiving oral iron at the ED  prior to the study. Overall this is still consistent with an iron deficiency anemia picture potentially with elevated blood iron levels due to recent administration. This is consistent with the patient's history.      - Hgb 8.7 currently. Goal above Hgb 7.   - Patient's vitals have remained stable. BP stable. HR elevated, but not tachycardic. NS infusion PRN.   - Patient has received 3 pRBC transfusions. Hgb stable. Hold pRBC.   - Lasix IV 20 mg.   - Transferrin 383 high.  - Retic Panel above and RPI 1.8 consistent with iron deficiency picture.   - FOBT pending.   - Trend CBC.      # GENERALIZED WEAKNESS - Patient reported generalized weakness on admission. Given her history and documented severe anemia, this is likely secondary to her anemia. Patient reports improvement with packed RBC transfusions.      - Continue monitor clinical symptoms of weakness. Appears resolved at this time.    - Patient on regular diet and tolerating well.   - Fall Precaution.  - Patient has been encouraged to ambulate more on her own.   - Improvement of symptoms in line with rising Hgb.      # ABNORMAL UTERINE BLEEDING - Patient reports she has had 2 month history of abnormal uterine bleeding. Patient reports regular menses before September that consists of bleeding for 3 days with 4 pads/day. In September, patient had lighter bleeding for 1.5 weeks. In October patient had heavy flow with 7 ultra pads/day for 3 weeks. Bleeding has since stopped and she is not currently bleeding. Etiology of abnormal bleeding currently unknown. Patient reports hair loss, 10lb weight gain this month and extremity swelling which are concerning for underlying endocrine syndrome like Cushing's or hypothyroid.      - Ddx: structural abnormalities such as polyps, coagulopathy, ovarian dysfunction including perimenopausal symptoms, endometrial thickening, cannot rule out malignancy. Other include PCOS and Cushing's syndrome. TSH normal and thyroid dysfunction  unlikely.   - TVUS finding most concerning is endometrial thickening secondary to hyperplasia, polyps or malignancy. Patient needs endometrial biopsy with GYN.  - GYN contacted. Can d/c patient tonight if GYN can do biopsy OP.   - FSH 2.9. LH 6. Consistent with Luteal phase. LH/FSH ratio approx 2. Possibly consistent with PCOS picture as well. Patient reports her menses ended 1 week ago, which would put her at Follicular phase in a normal cycle. Normal range Follicular 2.4-12.6, Mid-cycle 14-95.6, Luteal 1-11.4.   - Prolactin 23.45 normal.   - TSH 2.82 WNL.   - PT/INR: 14.1/1.08 WNL  - B-hcg neg      # HYPOCALCEMIA - Patient had normal calcium levels on admission, but has calcium of 8 today.      - Calcium carbonate with food.      # HEART MURMUR - murmur noted on heart exam. Patient asymptomatic.      - echocardiogram ordered. Unremarkable.     # TRICHOMONAS VAGINALIS INFECTION - Patient shown to have motile trichomonas on wet prep.      - Metronidazole 2 g one dose.   - Patient's partner will need to be treated.    - Give patient script for 2g Metronidazole.

## 2018-11-07 NOTE — DISCHARGE INSTRUCTIONS
Discharge Instructions    Discharged to home by car with relative. Discharged via wheelchair, hospital escort: Yes.  Special equipment needed: Not Applicable    Be sure to schedule a follow-up appointment with your primary care doctor or any specialists as instructed.     Discharge Plan:   Diet Plan: Discussed  Activity Level: Discussed  Confirmed Follow up Appointment: Appointment Scheduled  Confirmed Symptoms Management: Discussed  Medication Reconciliation Updated: Yes  Influenza Vaccine Indication: Patient Refuses    I understand that a diet low in cholesterol, fat, and sodium is recommended for good health. Unless I have been given specific instructions below for another diet, I accept this instruction as my diet prescription.   Other diet: Regular    Special Instructions: None    · Is patient discharged on Warfarin / Coumadin?   No     Depression / Suicide Risk    As you are discharged from this [facility] facility, it is important to learn how to keep safe from harming yourself.    Recognize the warning signs:  · Abrupt changes in personality, positive or negative- including increase in energy   · Giving away possessions  · Change in eating patterns- significant weight changes-  positive or negative  · Change in sleeping patterns- unable to sleep or sleeping all the time   · Unwillingness or inability to communicate  · Depression  · Unusual sadness, discouragement and loneliness  · Talk of wanting to die  · Neglect of personal appearance   · Rebelliousness- reckless behavior  · Withdrawal from people/activities they love  · Confusion- inability to concentrate     If you or a loved one observes any of these behaviors or has concerns about self-harm, here's what you can do:  · Talk about it- your feelings and reasons for harming yourself  · Remove any means that you might use to hurt yourself (examples: pills, rope, extension cords, firearm)  · Get professional help from the community (Mental Health,  Substance Abuse, psychological counseling)  · Do not be alone:Call your Safe Contact- someone whom you trust who will be there for you.  · Call your local CRISIS HOTLINE 132-7769 or 926-870-4334  · Call your local Children's Mobile Crisis Response Team [place] (883) 622-9892 or www.AgFlow  · Call the toll free National Suicide Prevention Hotlines   · National Suicide Prevention Lifeline 870-870-XCKY (2684)  · National Hope Line Network 800-SUICIDE (764-7484)

## 2018-11-07 NOTE — DISCHARGE SUMMARY
Internal Medicine Discharge Summary  Note Author: Kale Pierre M.D.       Name Sruthi Jenkins 1975   Age/Sex 43 y.o. female   MRN [id number]         Admit Date:  2018       Discharge Date:   2018    Service:   [facility] Internal Medicine Green Team  Attending Physician(s):   Dr. Ayala       Senior Resident(s):   Dr. Pierre  Khris Resident(s):   Dr. Mayes  PCP: Baldomero Lynch M.D.      Primary Diagnosis:   Symptomatic anemia    Secondary Diagnoses:                Principal Problem:    Anemia POA: Yes  Active Problems:    Trichomonas vaginalis infection POA: Yes      Overview:      Abnormal uterine bleeding (AUB) POA: Unknown  Resolved Problems:    Hypokalemia POA: Yes      Hospital Summary (Brief Narrative):       Patient is a 43-year-old female with past medical history of anemia who was found to have a hemoglobin of 5 was admitted to the hospital for acute blood transfusion as she was really symptomatic and dizzy.  Patient reported that she has been having heavy and irregular menstruation for the last few months.  She received 3 units of PRBC with her hemoglobin trending up to mid eights.  Workup was done to find out the cause of irregular menstruation, FSH LH level were indeterminate as it was not clear what stage of cycle she was in, transvaginal ultrasound was concerning was thick endometrial stripe concerning for malignancy.  Gynecology was consulted and they recommended outpatient workup.  Patient was also found to have trichomonas, she was given 2 g while inpatient and given 2 g twice dosage to share with her  for treatment of both partners.  Patient was also given 1 dose of IV iron as she was found to have severe iron deficiency.  She will continue oral iron supplementation.  Patient was also advised to follow-up with PCP and [facility] PCP has been established for the patient.  And we have also try to schedule an appointment with gynecology for the patient.  She was given a  leave note for work and as per her request rest for a few days before going back to the work.  On the day of discharge patient did not have significant symptoms.  Her dizziness has resolved.   Denies any vaginal bleeding.   Patient was also updated that she has prediabetes with HbA1c of 6.2.      Consultants:     None, called gynecology however was recommended outpatient follow-up    Procedures:        Be a blood transfusion    Imaging/ Testing:      EC-ECHOCARDIOGRAM COMPLETE W/O CONT   Final Result      US-PELVIC COMPLETE (TRANSABDOMINAL/TRANSVAGINAL) (COMBO)   Final Result      Thickened endometrial stripe. This can be seen in the setting of hyperplasia, polyps, with malignancy not excluded.      3.3 x 3.24 x 2.81 cm vaginal cyst with internal debris. This could represent a Bartholin's or Diana duct cyst.      Nabothian cysts in the cervix.      3.42 cm right ovarian cyst. Follow-up pelvic ultrasound in 6 weeks in a different phase of the patient's menstrual cycle is recommended.      Small amount of free fluid in the pelvis.               Discharge Medications:         Medication Reconciliation: Completed       Medication List      START taking these medications      Instructions   ascorbic acid 500 MG tablet  Commonly known as:  VITAMIN C   Take 1 Tab by mouth every day. Daily with Iron supplement  Dose:  500 mg     metroNIDAZOLE 500 MG Tabs  Commonly known as:  FLAGYL   Take 4 Tabs by mouth every day for 2 days.  Dose:  2000 mg        CHANGE how you take these medications      Instructions   ferrous sulfate 325 (65 Fe) MG tablet  What changed:  · medication strength  · how much to take  · when to take this  · additional instructions  Commonly known as:  FERROUSUL   Take 1 Tab by mouth every day.  Dose:  325 mg        CONTINUE taking these medications      Instructions   multivitamin Tabs   Take 1 Tab by mouth every day.  Dose:  1 Tab                    Disposition:   Discharge home    Diet:    Regular    Activity:   As tolerated    Instructions:      Please follow-up with PCP.  Please take your iron supplementation.  Please take the dose of metronidazole as prescribed, please follow-up with gynecology for consideration of endometrial biopsy.   The patient was instructed to return to the ER in the event of worsening symptoms. I have counseled the patient on the importance of compliance and the patient has agreed to proceed with all medical recommendations and follow up plan indicated above.   The patient understands that all medications come with benefits and risks. Risks may include permanent injury or death and these risks can be minimized with close reassessment and monitoring.        Primary Care Provider:    Baldomero Lynch M.D.    Discharge summary faxed to primary care provider:  Deferred  Copy of discharge summary given to the patient: Deferred      Follow up appointment details :      F/u with Gynecology for endometrial biopsy  F/u with PCP for follow up CBC    Pending Studies:        none    Time spent on discharge day patient visit, preparing discharge paperwork and arranging for patient follow up.    Summary of follow up issues:   Iron deficiency anemia, needs CBC follow up  Endometrial hyperplasia on U.S, needs workup from Gynecology    Discharge Time (Minutes) :    40 minutes  Hospital Course Type:  Inpatient Stay >2 midnights      Condition on Discharge    ______________________________________________________________________    Interval history/exam for day of discharge:    Patient is feeling better.  Does not have any more symptoms of dizziness or lightheadedness.  Denies any chest pain or shortness of breath.  She was able to ambulate in the hallways without any difficulty.    Physical exam:  General: Sitting comfortably in bed in no acute distress  HEENT: Oral mucosa moist, throat clear, no oral exudate  CVS: S1, S2, RRR, no murmur appreciated  RS: Clear to auscultation bilaterally,  no wheezing or any other added sound.  Abdomen: Soft, nontender, nondistended with positive bowel sounds  Neuro: Alert and oriented x4, cranial nerves grossly intact, no focal deficit.      Most Recent Labs:    Lab Results   Component Value Date/Time    WBC 7.8 11/07/2018 03:22 AM    RBC 3.90 (L) 11/07/2018 03:22 AM    HEMOGLOBIN 8.7 (L) 11/07/2018 03:22 AM    HEMATOCRIT 28.8 (L) 11/07/2018 03:22 AM    MCV 73.8 (L) 11/07/2018 03:22 AM    MCH 22.3 (L) 11/07/2018 03:22 AM    MCHC 30.2 (L) 11/07/2018 03:22 AM    MPV 9.4 11/07/2018 03:22 AM    NEUTSPOLYS 61.30 11/07/2018 03:22 AM    LYMPHOCYTES 25.10 11/07/2018 03:22 AM    MONOCYTES 9.60 11/07/2018 03:22 AM    EOSINOPHILS 1.90 11/07/2018 03:22 AM    BASOPHILS 0.90 11/07/2018 03:22 AM    HYPOCHROMIA 1+ 11/05/2018 07:15 PM    ANISOCYTOSIS 2+ 11/05/2018 07:15 PM      Lab Results   Component Value Date/Time    SODIUM 139 11/07/2018 03:22 AM    POTASSIUM 3.7 11/07/2018 03:22 AM    CHLORIDE 107 11/07/2018 03:22 AM    CO2 25 11/07/2018 03:22 AM    GLUCOSE 112 (H) 11/07/2018 03:22 AM    BUN 16 11/07/2018 03:22 AM    CREATININE 0.73 11/07/2018 03:22 AM      Lab Results   Component Value Date/Time    ALTSGPT 16 11/07/2018 03:22 AM    ASTSGOT 16 11/07/2018 03:22 AM    ALKPHOSPHAT 63 11/07/2018 03:22 AM    TBILIRUBIN 0.4 11/07/2018 03:22 AM    ALBUMIN 4.0 11/07/2018 03:22 AM    GLOBULIN 2.9 11/07/2018 03:22 AM    INR 1.08 11/05/2018 07:15 PM    MACROCYTOSIS 1+ 11/05/2018 07:15 PM     Lab Results   Component Value Date/Time    PROTHROMBTM 14.1 11/05/2018 07:15 PM    INR 1.08 11/05/2018 07:15 PM

## 2018-11-07 NOTE — CARE PLAN
Problem: Safety  Goal: Will remain free from injury  Outcome: PROGRESSING AS EXPECTED  Pt is independent. Pt denies any weakness. Safety precautions in place. Will continue to monitor.     Problem: Knowledge Deficit  Goal: Knowledge of disease process/condition, treatment plan, diagnostic tests, and medications will improve  Outcome: PROGRESSING AS EXPECTED  Educated pt on POC which includes iron supplementation, VS, lab monitoring. Pt verbalized understanding.

## 2018-11-08 ENCOUNTER — PATIENT OUTREACH (OUTPATIENT)
Dept: HEALTH INFORMATION MANAGEMENT | Facility: OTHER | Age: 43
End: 2018-11-08

## 2018-11-08 NOTE — PROGRESS NOTES
Patient was discharged home at 1612 via private vehicle with family. IV site was discontinue. Patient was sent with belongings, prescriptions, discharge paperwork. All questions were asked and answered.

## 2018-11-08 NOTE — DOCUMENTATION QUERY
DOCUMENTATION QUERY    PROVIDERS: Please select “Cosign w/ note”to reply to query.    To better represent the severity of illness of your patient, please review the following information and exercise your independent professional judgment in responding to this query.     Severe anemia, likely from blood loss, is documented in the History and Physical and Progress Notes. Based upon the clinical findings, risk factors, and treatment, can this diagnosis be further specified?    • Acute blood loss anemia  • Chronic blood loss anemia  • Other type of anemia (please specify type)  • Other explanation of clinical findings  • Unable to determine     The medical record reflects the following:   Clinical Findings Per Senior Admit Note:   Severe anemia likely from blood loss anemia given recent heavy bleeding.    Results Review:   Hg 4.8 increased to 8.7   Treatment Transfusion of PRBCs, Venofer, & monitor labs   Risk Factors Menorrhagia & trichomonas vaginalis    Location within medical record History & Physical, Progress Notes, Discharge Summary, & Lab Results      Thank you,   Katerina Decker RN  Clinical   Phone [phone]

## 2018-11-15 ENCOUNTER — OFFICE VISIT (OUTPATIENT)
Dept: MEDICAL GROUP | Facility: MEDICAL CENTER | Age: 43
End: 2018-11-15
Payer: COMMERCIAL

## 2018-11-15 VITALS
SYSTOLIC BLOOD PRESSURE: 110 MMHG | RESPIRATION RATE: 16 BRPM | HEART RATE: 74 BPM | WEIGHT: 229 LBS | DIASTOLIC BLOOD PRESSURE: 68 MMHG | BODY MASS INDEX: 39.09 KG/M2 | HEIGHT: 64 IN | TEMPERATURE: 97.8 F | OXYGEN SATURATION: 99 %

## 2018-11-15 DIAGNOSIS — E66.9 OBESITY (BMI 30-39.9): ICD-10-CM

## 2018-11-15 DIAGNOSIS — Z09 HOSPITAL DISCHARGE FOLLOW-UP: ICD-10-CM

## 2018-11-15 DIAGNOSIS — N93.9 ABNORMAL UTERINE BLEEDING (AUB): ICD-10-CM

## 2018-11-15 DIAGNOSIS — R73.03 PREDIABETES: ICD-10-CM

## 2018-11-15 DIAGNOSIS — D50.0 BLOOD LOSS ANEMIA: ICD-10-CM

## 2018-11-15 PROCEDURE — 99203 OFFICE O/P NEW LOW 30 MIN: CPT | Performed by: FAMILY MEDICINE

## 2018-11-15 NOTE — PATIENT INSTRUCTIONS
If you need further assistance, or have any questions; concerns or lingering symptoms before seeing your Primary Care Provider or specialist.     Do not hesitate to contact us.     Please contact us at the Post-Hospital Follow Up Program at [phone] and ask for the Medical Assistant for Dr Son.   Our offices hours are Monday-Friday 8 am-5 pm.

## 2018-11-15 NOTE — PROGRESS NOTES
Subjective:     Sruthi Jenkins is a 43 y.o. female who presents for Hospital Follow-up.  Chart and discharge summary reviewed.   Date of discharge 11/7/18.  48- hour post discharge RN call completed on 11/8/18 and documented in the medical record by Rachel Cornelius RN:  POST DISCHARGE CALL:  Discharge Date:11/7/2018   Date of Outreach Call: 11/8/2018  1:29 PM  Now that you're home, how are you doing? Good  Comment:Pt reports she is feeling better. States she did  schedule a gynecology appt for 11/27  Do you have questions about your medications? No    Did you fill your medications? Yes    Do you have a follow-up appointment scheduled?Yes  Comment:Post discharge clinic on 11/15    Discharging Department: Telemetry 7    Number of Attempts: 1  Current or previous attempts completed within two business days of discharge? Yes  Provided education regarding treatment plan, medication, self-management, ADLs? Yes  Has patient completed Advance Directive? If yes, advise them to bring to appointment. No  Care Manager phone number provided? Yes  Is there anything else I can help you with? No         HPI: Recently hospitalized for severe iron deficiency anemia due to abnormal uterine bleeding for 1 month.  Hemoglobin on presentation was 4.8.  She was transfused 3 units of packed red blood cells and also got an iron infusion.  She was started on iron supplements.  Vaginal ultrasound showed a thickened endometrial stripe as well as a vaginal cyst and nabothian cysts in the cervix.  Also found was a 3.42 cm right ovarian cyst.  Gynecology recommended outpatient workup.  She has an appointment with Dr. Taina Ivey in the next couple of weeks.  She has had no bleeding since her hospitalization.  She was also treated for trichomonas and diagnosed with prediabetes.  Hemoglobin A1c was 6.2.  She had not seen a doctor in many years.    Since returning home, patient reports feeling very good.     The patient has questions regarding  hospitalization or discharge: All questions were answered.  The patient denies weakness; denies difficulty taking care of self at home.  Patient reports taking medications as instructed.    Current Outpatient Prescriptions   Medication Sig Dispense Refill   • ferrous sulfate (FERROUSUL) 325 (65 Fe) MG tablet Take 1 Tab by mouth every day. 90 Tab 2   • ascorbic acid (VITAMIN C) 500 MG tablet Take 1 Tab by mouth every day. Daily with Iron supplement 90 Tab 2   • multivitamin (THERAGRAN) Tab Take 1 Tab by mouth every day.       No current facility-administered medications for this visit.        Allergies:   Patient has no known allergies.    Social History:  Social History   Substance Use Topics   • Smoking status: Never Smoker   • Smokeless tobacco: Never Used   • Alcohol use No        Family History:  History reviewed. No pertinent family history.    History reviewed. No pertinent past medical history.    Surgical History  History reviewed. No pertinent surgical history.    ROS:    Constitutional:  Denies fever, chills, night sweats.  She no longer feels fatigued since her transfusions  HENT: Denies ear pain or drainage, decreased hearing, sore throat.  She has some head congestion from allergies.  Eyes: Denies visual changes, eye drainage or redness, eye pain  Neck: Denies pain, swollen glands, decreased range of motion  Lungs: Denies shortness of breath, wheezing, cough  Cardiovascular: Denies chest pain, orthopnea, lower extremity edema, palpitations  Abdominal: Denies abdominal pain, change in bowel or bladder habits, nausea or vomiting  Musculoskeletal: Denies back pain, joint pains, decreased range of motion  Endocrine: Denies unexplained weight changes, heat or cold intolerance, polyuria or polydipsia.  She noted some hair loss.  TSH was checked and was normal.  Neurological: Denies dizziness, headache, confusion, focal weakness or numbness, memory loss  Psychiatric: Denies depression, anxiety, insomnia        "Objective:     Blood pressure 110/68, pulse 74, temperature 36.6 °C (97.8 °F), temperature source Temporal, resp. rate 16, height 1.626 m (5' 4\"), weight 103.9 kg (229 lb), last menstrual period 11/01/2018, SpO2 99 %, not currently breastfeeding.     Physical Exam:    GEN:  Alert, oriented, in no distress  HEENT:  PERRLA, EOMI  NECK;  Supple without cervical adenopathy or thyromegaly.    LUNGS:  Clear to auscultation without rales, rhonci, or wheezes.  CV:  Heart RRR without murmurs or S3 or S4  ABD:  Soft, nontender, nondistended  EXT:  Without cyanosis, clubbing, or edema.  NEURO:  Cranial nerves II through XII intact.  Motor function and sensation intact.  SKIN: No rashes or suspicious lesions.  PSYCH:  Behavior is appropriate.      Assessment and Plan:     1. Hospital follow-up:   Hospitalization and results reviewed with patient. High risk conditions requiring teaching or care coordination were identified and addressed.The patient demonstrate understanding of admission and underlying conditions. The patient understands discharge instructions and when to seek medical attention. Medications reviewed including instructions regarding high risk medications, dosing and side effects.    The patient is able to safely adhere to ADL/IADL, treatment and medication regimen, self-manage of high-risk conditions? Yes  The patient requires physical therapy/home health/DME referral? No   The patient requires referral to care coordination/behavioral health/social work?  No   Patient requires referral for pharmacy consult? No     2. Abnormal uterine bleeding (AUB)  -Gynecology appointment pending.    3. Blood loss anemia  -Status post transfusion and iron infusion.  Now taking iron supplements.    4. Prediabetes  -Appropriate counseling given regarding lifestyle modification including healthy eating habits, regular exercise.    5. Obesity (BMI 30-39.9)  -Appropriate counseling given regarding lifestyle modification including " healthy eating habits, regular exercise.        Medication Reconciliation  Medication list at end of encounter:   Current Outpatient Prescriptions   Medication Sig Dispense Refill   • ferrous sulfate (FERROUSUL) 325 (65 Fe) MG tablet Take 1 Tab by mouth every day. 90 Tab 2   • ascorbic acid (VITAMIN C) 500 MG tablet Take 1 Tab by mouth every day. Daily with Iron supplement 90 Tab 2   • multivitamin (THERAGRAN) Tab Take 1 Tab by mouth every day.       No current facility-administered medications for this visit.        Primary care follow-up:    Recommended followup:  with new PCP Baldomero Lynch M.D.   Future Appointments       Provider Department [place]    11/28/2018 3:15 PM Baldomero Lynch M.D. [facility] - Internal Medicine           Patient Instruction  Patient provided with educational material on discharge diagnosis and management of symptoms/red flags. Patient instructed to keep follow-up appointments and to bring written questions and and actual medications to each office visit. Patient instructed to call PCP/specialist with any problems/questions/concerns. Patient verbalizes understanding and has no further questions at this time.    Total of 30 minutes face-to-face time was spent with patient, with greater than 50% of the time spent in counseling regarding necessary lifestyle modifications as noted above and coordination of care.

## 2020-06-01 ENCOUNTER — HOSPITAL ENCOUNTER (OUTPATIENT)
Facility: MEDICAL CENTER | Age: 45
End: 2020-06-01
Payer: COMMERCIAL

## 2020-06-04 LAB
SARS-COV-2 RNA SPEC QL NAA+PROBE: NOT DETECTED
SPECIMEN SOURCE: NORMAL

## 2020-07-29 ENCOUNTER — HOSPITAL ENCOUNTER (OUTPATIENT)
Facility: MEDICAL CENTER | Age: 45
End: 2020-07-29
Attending: OBSTETRICS & GYNECOLOGY | Admitting: OBSTETRICS & GYNECOLOGY
Payer: COMMERCIAL

## 2021-06-11 ENCOUNTER — HOSPITAL ENCOUNTER (EMERGENCY)
Facility: MEDICAL CENTER | Age: 46
End: 2021-06-11
Attending: EMERGENCY MEDICINE

## 2021-06-11 ENCOUNTER — APPOINTMENT (OUTPATIENT)
Dept: RADIOLOGY | Facility: MEDICAL CENTER | Age: 46
End: 2021-06-11
Attending: EMERGENCY MEDICINE

## 2021-06-11 VITALS
RESPIRATION RATE: 22 BRPM | TEMPERATURE: 98.5 F | HEIGHT: 64 IN | HEART RATE: 93 BPM | OXYGEN SATURATION: 98 % | DIASTOLIC BLOOD PRESSURE: 70 MMHG | BODY MASS INDEX: 41.14 KG/M2 | WEIGHT: 240.96 LBS | SYSTOLIC BLOOD PRESSURE: 106 MMHG

## 2021-06-11 DIAGNOSIS — N93.8 DYSFUNCTIONAL UTERINE BLEEDING: ICD-10-CM

## 2021-06-11 DIAGNOSIS — D64.9 ANEMIA, UNSPECIFIED TYPE: ICD-10-CM

## 2021-06-11 LAB
ABO GROUP BLD: NORMAL
ALBUMIN SERPL BCP-MCNC: 3.8 G/DL (ref 3.2–4.9)
ALBUMIN/GLOB SERPL: 1.2 G/DL
ALP SERPL-CCNC: 80 U/L (ref 30–99)
ALT SERPL-CCNC: 16 U/L (ref 2–50)
ANION GAP SERPL CALC-SCNC: 10 MMOL/L (ref 7–16)
AST SERPL-CCNC: 15 U/L (ref 12–45)
BASOPHILS # BLD AUTO: 0.4 % (ref 0–1.8)
BASOPHILS # BLD: 0.03 K/UL (ref 0–0.12)
BILIRUB SERPL-MCNC: <0.2 MG/DL (ref 0.1–1.5)
BLD GP AB SCN SERPL QL: NORMAL
BUN SERPL-MCNC: 12 MG/DL (ref 8–22)
CALCIUM SERPL-MCNC: 9 MG/DL (ref 8.5–10.5)
CHLORIDE SERPL-SCNC: 102 MMOL/L (ref 96–112)
CO2 SERPL-SCNC: 24 MMOL/L (ref 20–33)
CREAT SERPL-MCNC: 0.88 MG/DL (ref 0.5–1.4)
EOSINOPHIL # BLD AUTO: 0.44 K/UL (ref 0–0.51)
EOSINOPHIL NFR BLD: 5.6 % (ref 0–6.9)
ERYTHROCYTE [DISTWIDTH] IN BLOOD BY AUTOMATED COUNT: 43.6 FL (ref 35.9–50)
GLOBULIN SER CALC-MCNC: 3.2 G/DL (ref 1.9–3.5)
GLUCOSE SERPL-MCNC: 108 MG/DL (ref 65–99)
HCG SERPL QL: NEGATIVE
HCT VFR BLD AUTO: 28.6 % (ref 37–47)
HGB BLD-MCNC: 8.8 G/DL (ref 12–16)
IMM GRANULOCYTES # BLD AUTO: 0.1 K/UL (ref 0–0.11)
IMM GRANULOCYTES NFR BLD AUTO: 1.3 % (ref 0–0.9)
LYMPHOCYTES # BLD AUTO: 1.78 K/UL (ref 1–4.8)
LYMPHOCYTES NFR BLD: 22.6 % (ref 22–41)
MCH RBC QN AUTO: 24.7 PG (ref 27–33)
MCHC RBC AUTO-ENTMCNC: 30.8 G/DL (ref 33.6–35)
MCV RBC AUTO: 80.3 FL (ref 81.4–97.8)
MONOCYTES # BLD AUTO: 0.7 K/UL (ref 0–0.85)
MONOCYTES NFR BLD AUTO: 8.9 % (ref 0–13.4)
NEUTROPHILS # BLD AUTO: 4.81 K/UL (ref 2–7.15)
NEUTROPHILS NFR BLD: 61.2 % (ref 44–72)
NRBC # BLD AUTO: 0.02 K/UL
NRBC BLD-RTO: 0.3 /100 WBC
NUMBER OF RH DOSES IND 8505RD: NORMAL
PLATELET # BLD AUTO: 334 K/UL (ref 164–446)
PMV BLD AUTO: 10.1 FL (ref 9–12.9)
POTASSIUM SERPL-SCNC: 3.8 MMOL/L (ref 3.6–5.5)
PROT SERPL-MCNC: 7 G/DL (ref 6–8.2)
RBC # BLD AUTO: 3.56 M/UL (ref 4.2–5.4)
RH BLD: NORMAL
RH BLD: NORMAL
SODIUM SERPL-SCNC: 136 MMOL/L (ref 135–145)
WBC # BLD AUTO: 7.9 K/UL (ref 4.8–10.8)

## 2021-06-11 PROCEDURE — 86850 RBC ANTIBODY SCREEN: CPT

## 2021-06-11 PROCEDURE — 84703 CHORIONIC GONADOTROPIN ASSAY: CPT

## 2021-06-11 PROCEDURE — 80053 COMPREHEN METABOLIC PANEL: CPT

## 2021-06-11 PROCEDURE — 700105 HCHG RX REV CODE 258: Performed by: EMERGENCY MEDICINE

## 2021-06-11 PROCEDURE — 86900 BLOOD TYPING SEROLOGIC ABO: CPT

## 2021-06-11 PROCEDURE — 76830 TRANSVAGINAL US NON-OB: CPT

## 2021-06-11 PROCEDURE — 86901 BLOOD TYPING SEROLOGIC RH(D): CPT

## 2021-06-11 PROCEDURE — 85025 COMPLETE CBC W/AUTO DIFF WBC: CPT

## 2021-06-11 PROCEDURE — 99284 EMERGENCY DEPT VISIT MOD MDM: CPT

## 2021-06-11 RX ORDER — SODIUM CHLORIDE 9 MG/ML
1000 INJECTION, SOLUTION INTRAVENOUS ONCE
Status: COMPLETED | OUTPATIENT
Start: 2021-06-11 | End: 2021-06-11

## 2021-06-11 RX ORDER — MEDROXYPROGESTERONE ACETATE 10 MG/1
10 TABLET ORAL DAILY
Qty: 10 TABLET | Refills: 0 | Status: ON HOLD | OUTPATIENT
Start: 2021-06-11 | End: 2023-03-31

## 2021-06-11 RX ADMIN — SODIUM CHLORIDE 1000 ML: 9 INJECTION, SOLUTION INTRAVENOUS at 14:17

## 2021-06-11 ASSESSMENT — LIFESTYLE VARIABLES
TOTAL SCORE: 0
AVERAGE NUMBER OF DAYS PER WEEK YOU HAVE A DRINK CONTAINING ALCOHOL: 0
TOTAL SCORE: 0
HAVE YOU EVER FELT YOU SHOULD CUT DOWN ON YOUR DRINKING: NO
EVER FELT BAD OR GUILTY ABOUT YOUR DRINKING: NO
EVER HAD A DRINK FIRST THING IN THE MORNING TO STEADY YOUR NERVES TO GET RID OF A HANGOVER: NO
HAVE PEOPLE ANNOYED YOU BY CRITICIZING YOUR DRINKING: NO
DO YOU DRINK ALCOHOL: NO
TOTAL SCORE: 0
ON A TYPICAL DAY WHEN YOU DRINK ALCOHOL HOW MANY DRINKS DO YOU HAVE: 0
CONSUMPTION TOTAL: NEGATIVE
HOW MANY TIMES IN THE PAST YEAR HAVE YOU HAD 5 OR MORE DRINKS IN A DAY: 0

## 2021-06-11 NOTE — ED TRIAGE NOTES
Ambulatory to triage with report of  Chief Complaint   Patient presents with   • Vaginal Bleeding     X 3 weeks, changing pads every 3-4 hours.  unknown pregnancy.  reports large clots.  reports heavier menstruation than usual.  denies pain or cramping.  reports increased fatigue and weakness with Hx of anemia   also reports recent increased stress in life

## 2021-06-11 NOTE — ED PROVIDER NOTES
ED Provider Note    CHIEF COMPLAINT  Chief Complaint   Patient presents with   • Vaginal Bleeding     X 3 weeks, changing pads every 3-4 hours.  unknown pregnancy.  reports large clots.  reports heavier menstruation than usual.  denies pain or cramping.  reports increased fatigue and weakness with Hx of anemia       HPI  Sruthi Jenkins is a 45 y.o. female who presents with vaginal bleeding.  Patient is a history of dysfunctional bleeding, requiring transfusion it sounds like.  She was also set up for a hysterectomy, but ended up having to leave [employer] rather abruptly.  This was never done.  She subsequently returned.  She was on oral contraceptives, stop this at the end of the year, things seem to be fine.  However 3 weeks ago she started bleeding again.  Initially just a little bit, last week or so it has been more heavy, she is going through 4- 5 pads per day.  She today felt very tired just weak all over.  She denies any dizziness, chest pain or shortness of breath.  She has some abdominal cramping.  Nothing makes it better or worse.  No urine changes.  No other vaginal discharge.  No stool changes.  No fever chills.  There is no other complaint.  No personal or family history of clotting dyscrasias.  She just started smoking on occasion in the last week or so because of stress.  She intends to not continue this.    PAST MEDICAL HISTORY  Anemia, dysfunctional bleeding    FAMILY HISTORY  No family history on file.    SOCIAL HISTORY  Social History     Tobacco Use   • Smoking status: Current Every Day Smoker     Packs/day: 0.50   • Smokeless tobacco: Never Used   Substance Use Topics   • Alcohol use: No   • Drug use: No         SURGICAL HISTORY  History reviewed. No pertinent surgical history.    CURRENT MEDICATIONS  Home Medications     Reviewed by Carola Walters R.N. (Registered Nurse) on 06/11/21 at 1236  Med List Status: <None>   Medication Last Dose Status   ascorbic acid (VITAMIN C) 500 MG tablet  Active  "  ferrous sulfate (FERROUSUL) 325 (65 Fe) MG tablet  Active   multivitamin (THERAGRAN) Tab  Active                I have reviewed the nurses notes and/or the list brought with the patient.    ALLERGIES  No Known Allergies    REVIEW OF SYSTEMS  See HPI for further details. Review of systems as above, otherwise all other systems are negative.     PHYSICAL EXAM  VITAL SIGNS: /90   Pulse (!) 120   Temp 36.9 °C (98.5 °F) (Temporal)   Resp 20   Ht 1.626 m (5' 4\")   Wt 109 kg (240 lb 15.4 oz)   LMP 05/28/2021 (Approximate)   SpO2 99%   BMI 41.36 kg/m²     Constitutional: Well appearing patient in no acute distress.  Not toxic, nor ill in appearance.  HENT: Mucus membranes moist.  Oropharynx is clear.  Eyes: Pupils equally round.  No scleral icterus.   Neck: Full nontender range of motion.  Lymphatic: No cervical lymphadenopathy noted.   Cardiovascular: Triage heart rate is noted.  To my count its right at 100.  No murmurs, rubs, nor gallop appreciated.   Thorax & Lungs: Chest is nontender.  Lungs are clear to auscultation with good air movement bilaterally.  No wheeze, rhonchi, nor rales.   Abdomen: Soft, with no tenderness, rebound nor guarding.  No mass, pulsatile mass, nor hepatosplenomegaly appreciated.  Gyn: Ja as female chaperone.  Normal external genitalia.  There is blood in the vault.  Otherwise unremarkable to inspection.  The cervix is nontender.  There is no nasal mass or tenderness.  Skin: No purpura nor petechia noted.  Extremities/Musculoskeletal: No sign of trauma.    Neurologic: Alert & oriented.  Strength and sensation is intact all around.  Gait is normal.  Psychiatric: Normal affect appropriate for the clinical situation.    LABS  Labs Reviewed   CBC WITH DIFFERENTIAL - Abnormal; Notable for the following components:       Result Value    RBC 3.56 (*)     Hemoglobin 8.8 (*)     Hematocrit 28.6 (*)     MCV 80.3 (*)     MCH 24.7 (*)     MCHC 30.8 (*)     Immature Granulocytes 1.30 (*)  "    All other components within normal limits    Narrative:     Print Consent?->No   COMP METABOLIC PANEL - Abnormal; Notable for the following components:    Glucose 108 (*)     All other components within normal limits    Narrative:     Print Consent?->No   RH TYPE FOR RHOGAM FROM E.D.    Narrative:     Print Consent?->No   COD (ADULT)   HCG QUAL SERUM   ESTIMATED GFR    Narrative:     Print Consent?->No         RADIOLOGY/PROCEDURES  I have reviewed the patient's film interpretations myself, and they are read out by the radiologist as:   US-PELVIC COMPLETE (TRANSABDOMINAL/TRANSVAGINAL) (COMBO)   Final Result      1.  Heterogeneous and thickened endometrial stripe.   2.  Probable uterine fibroids.   3.  Nabothian cysts.        .    MEDICAL RECORD  I have reviewed patient's medical record and pertinent results are listed above.    COURSE & MEDICAL DECISION MAKING  I have reviewed any medical record information, laboratory studies and radiographic results as noted above.  This patient presents with vaginal bleeding, likely dysfunctional bleeding.  Initially she was quite tachycardic and because of this IV fluids were ordered and she is not felt to be a candidate for p.o. hydration to get laboratories, however she received a half of her bolus, and her tachycardia is resolved, blood pressure remains normal.  She does have some mild anemia, however this appears to be consistent what she was discharged from the hospital with last time.  She is not a candidate for blood transfusion at this point.      I talk with Dr. Ivey who knows the patient well.  She would like to start her on Provera, 10 mg for 10 days.  She is asked me to encourage her to keep her appointment.  She will follow up with her.  Instructions on anemia and dysfunctional bleeding.    As an addendum, the patient will not be smoking while on hormonal therapy.  She states this will not be an issue.    FINAL IMPRESSION  1. Dysfunctional uterine bleeding    2.  Anemia, unspecified type           This dictation was created using voice recognition software.    Electronically signed by: Ward Plascencia M.D., 6/11/2021 2:30 PM

## 2021-06-11 NOTE — ED NOTES
Pt resting in bed, VSS on RA, GCS 15, NAD, pt aware POC for obgyn consult, will continue to monitor.

## 2022-01-20 ENCOUNTER — APPOINTMENT (OUTPATIENT)
Dept: RADIOLOGY | Facility: MEDICAL CENTER | Age: 47
End: 2022-01-20
Attending: EMERGENCY MEDICINE

## 2022-01-20 ENCOUNTER — HOSPITAL ENCOUNTER (EMERGENCY)
Facility: MEDICAL CENTER | Age: 47
End: 2022-01-20
Attending: EMERGENCY MEDICINE

## 2022-01-20 VITALS
DIASTOLIC BLOOD PRESSURE: 82 MMHG | WEIGHT: 230 LBS | RESPIRATION RATE: 16 BRPM | HEART RATE: 77 BPM | TEMPERATURE: 96.8 F | SYSTOLIC BLOOD PRESSURE: 125 MMHG | HEIGHT: 64 IN | OXYGEN SATURATION: 95 % | BODY MASS INDEX: 39.27 KG/M2

## 2022-01-20 DIAGNOSIS — R10.13 EPIGASTRIC PAIN: ICD-10-CM

## 2022-01-20 DIAGNOSIS — D50.9 IRON DEFICIENCY ANEMIA, UNSPECIFIED IRON DEFICIENCY ANEMIA TYPE: ICD-10-CM

## 2022-01-20 LAB
ABO GROUP BLD: NORMAL
ALBUMIN SERPL BCP-MCNC: 4.2 G/DL (ref 3.2–4.9)
ALBUMIN/GLOB SERPL: 1.4 G/DL
ALP SERPL-CCNC: 83 U/L (ref 30–99)
ALT SERPL-CCNC: 14 U/L (ref 2–50)
ANION GAP SERPL CALC-SCNC: 14 MMOL/L (ref 7–16)
ANISOCYTOSIS BLD QL SMEAR: ABNORMAL
APPEARANCE UR: CLEAR
AST SERPL-CCNC: 14 U/L (ref 12–45)
BACTERIA #/AREA URNS HPF: ABNORMAL /HPF
BARCODED ABORH UBTYP: 5100
BARCODED PRD CODE UBPRD: NORMAL
BARCODED UNIT NUM UBUNT: NORMAL
BASOPHILS # BLD AUTO: 0 % (ref 0–1.8)
BASOPHILS # BLD: 0 K/UL (ref 0–0.12)
BILIRUB SERPL-MCNC: 0.2 MG/DL (ref 0.1–1.5)
BILIRUB UR QL STRIP.AUTO: NEGATIVE
BLD GP AB SCN SERPL QL: NORMAL
BUN SERPL-MCNC: 13 MG/DL (ref 8–22)
CALCIUM SERPL-MCNC: 8.9 MG/DL (ref 8.5–10.5)
CHLORIDE SERPL-SCNC: 102 MMOL/L (ref 96–112)
CO2 SERPL-SCNC: 21 MMOL/L (ref 20–33)
COLOR UR: YELLOW
COMPONENT R 8504R: NORMAL
CREAT SERPL-MCNC: 0.7 MG/DL (ref 0.5–1.4)
EKG IMPRESSION: NORMAL
EOSINOPHIL # BLD AUTO: 0.09 K/UL (ref 0–0.51)
EOSINOPHIL NFR BLD: 0.9 % (ref 0–6.9)
EPI CELLS #/AREA URNS HPF: ABNORMAL /HPF
ERYTHROCYTE [DISTWIDTH] IN BLOOD BY AUTOMATED COUNT: 42 FL (ref 35.9–50)
FERRITIN SERPL-MCNC: 1.7 NG/ML (ref 10–291)
GLOBULIN SER CALC-MCNC: 2.9 G/DL (ref 1.9–3.5)
GLUCOSE SERPL-MCNC: 155 MG/DL (ref 65–99)
GLUCOSE UR STRIP.AUTO-MCNC: NEGATIVE MG/DL
HCG SERPL QL: NEGATIVE
HCT VFR BLD AUTO: 27.5 % (ref 37–47)
HGB BLD-MCNC: 6.7 G/DL (ref 12–16)
HYALINE CASTS #/AREA URNS LPF: ABNORMAL /LPF
HYPOCHROMIA BLD QL SMEAR: ABNORMAL
IRON SATN MFR SERPL: 3 % (ref 15–55)
IRON SERPL-MCNC: 12 UG/DL (ref 40–170)
KETONES UR STRIP.AUTO-MCNC: NEGATIVE MG/DL
LEUKOCYTE ESTERASE UR QL STRIP.AUTO: ABNORMAL
LG PLATELETS BLD QL SMEAR: NORMAL
LIPASE SERPL-CCNC: 37 U/L (ref 11–82)
LYMPHOCYTES # BLD AUTO: 1.93 K/UL (ref 1–4.8)
LYMPHOCYTES NFR BLD: 18.6 % (ref 22–41)
MANUAL DIFF BLD: NORMAL
MCH RBC QN AUTO: 13.9 PG (ref 27–33)
MCHC RBC AUTO-ENTMCNC: 24.4 G/DL (ref 33.6–35)
MCV RBC AUTO: 56.9 FL (ref 81.4–97.8)
MICRO URNS: ABNORMAL
MICROCYTES BLD QL SMEAR: ABNORMAL
MONOCYTES # BLD AUTO: 0.46 K/UL (ref 0–0.85)
MONOCYTES NFR BLD AUTO: 4.4 % (ref 0–13.4)
MORPHOLOGY BLD-IMP: NORMAL
NEUTROPHILS # BLD AUTO: 7.91 K/UL (ref 2–7.15)
NEUTROPHILS NFR BLD: 76.1 % (ref 44–72)
NITRITE UR QL STRIP.AUTO: NEGATIVE
NRBC # BLD AUTO: 0 K/UL
NRBC BLD-RTO: 0 /100 WBC
OVALOCYTES BLD QL SMEAR: NORMAL
PH UR STRIP.AUTO: 5 [PH] (ref 5–8)
PLATELET # BLD AUTO: 371 K/UL (ref 164–446)
PLATELET BLD QL SMEAR: NORMAL
PMV BLD AUTO: 9.3 FL (ref 9–12.9)
POIKILOCYTOSIS BLD QL SMEAR: NORMAL
POTASSIUM SERPL-SCNC: 3.3 MMOL/L (ref 3.6–5.5)
PRODUCT TYPE UPROD: NORMAL
PROT SERPL-MCNC: 7.1 G/DL (ref 6–8.2)
PROT UR QL STRIP: NEGATIVE MG/DL
RBC # BLD AUTO: 4.83 M/UL (ref 4.2–5.4)
RBC # URNS HPF: ABNORMAL /HPF
RBC BLD AUTO: PRESENT
RBC UR QL AUTO: NEGATIVE
RH BLD: NORMAL
SCHISTOCYTES BLD QL SMEAR: NORMAL
SODIUM SERPL-SCNC: 137 MMOL/L (ref 135–145)
SP GR UR STRIP.AUTO: 1
TIBC SERPL-MCNC: 426 UG/DL (ref 250–450)
TROPONIN T SERPL-MCNC: <6 NG/L (ref 6–19)
UIBC SERPL-MCNC: 414 UG/DL (ref 110–370)
UNIT STATUS USTAT: NORMAL
UROBILINOGEN UR STRIP.AUTO-MCNC: 0.2 MG/DL
WBC # BLD AUTO: 10.4 K/UL (ref 4.8–10.8)
WBC #/AREA URNS HPF: ABNORMAL /HPF

## 2022-01-20 PROCEDURE — 71045 X-RAY EXAM CHEST 1 VIEW: CPT

## 2022-01-20 PROCEDURE — 86923 COMPATIBILITY TEST ELECTRIC: CPT

## 2022-01-20 PROCEDURE — 80053 COMPREHEN METABOLIC PANEL: CPT

## 2022-01-20 PROCEDURE — 76705 ECHO EXAM OF ABDOMEN: CPT

## 2022-01-20 PROCEDURE — 81001 URINALYSIS AUTO W/SCOPE: CPT

## 2022-01-20 PROCEDURE — 93005 ELECTROCARDIOGRAM TRACING: CPT | Performed by: EMERGENCY MEDICINE

## 2022-01-20 PROCEDURE — 87086 URINE CULTURE/COLONY COUNT: CPT

## 2022-01-20 PROCEDURE — 85007 BL SMEAR W/DIFF WBC COUNT: CPT

## 2022-01-20 PROCEDURE — 36430 TRANSFUSION BLD/BLD COMPNT: CPT

## 2022-01-20 PROCEDURE — 84484 ASSAY OF TROPONIN QUANT: CPT

## 2022-01-20 PROCEDURE — P9016 RBC LEUKOCYTES REDUCED: HCPCS

## 2022-01-20 PROCEDURE — 83690 ASSAY OF LIPASE: CPT

## 2022-01-20 PROCEDURE — 82728 ASSAY OF FERRITIN: CPT

## 2022-01-20 PROCEDURE — 86901 BLOOD TYPING SEROLOGIC RH(D): CPT

## 2022-01-20 PROCEDURE — 85027 COMPLETE CBC AUTOMATED: CPT

## 2022-01-20 PROCEDURE — 84703 CHORIONIC GONADOTROPIN ASSAY: CPT

## 2022-01-20 PROCEDURE — 86850 RBC ANTIBODY SCREEN: CPT

## 2022-01-20 PROCEDURE — 83550 IRON BINDING TEST: CPT

## 2022-01-20 PROCEDURE — 86900 BLOOD TYPING SEROLOGIC ABO: CPT

## 2022-01-20 PROCEDURE — 93005 ELECTROCARDIOGRAM TRACING: CPT

## 2022-01-20 PROCEDURE — 99285 EMERGENCY DEPT VISIT HI MDM: CPT

## 2022-01-20 PROCEDURE — 87186 SC STD MICRODIL/AGAR DIL: CPT

## 2022-01-20 PROCEDURE — 83540 ASSAY OF IRON: CPT

## 2022-01-20 PROCEDURE — 87077 CULTURE AEROBIC IDENTIFY: CPT

## 2022-01-20 RX ORDER — OMEPRAZOLE 20 MG/1
20 CAPSULE, DELAYED RELEASE ORAL DAILY
Qty: 30 CAPSULE | Refills: 0 | Status: ON HOLD | OUTPATIENT
Start: 2022-01-20 | End: 2023-03-31

## 2022-01-20 RX ORDER — FERROUS SULFATE 325(65) MG
325 TABLET ORAL DAILY
Qty: 90 TABLET | Refills: 0 | Status: ON HOLD | OUTPATIENT
Start: 2022-01-20 | End: 2023-03-31

## 2022-01-20 ASSESSMENT — FIBROSIS 4 INDEX: FIB4 SCORE: 0.49

## 2022-01-20 NOTE — ED NOTES
Pt ambulatory to room with a steady gait. Pt changed into gown and placed on monitor. Pt states pain has improved since being here. Denies any current pain. States felt like heartburn but worse, reports she had one episode of vomiting today. Denies blood in emesis or stool. Pt reports hx of anemia. States stopped her iron approx 3 months ago.  Chart up for ERP.

## 2022-01-20 NOTE — ED TRIAGE NOTES
"Sruthi Jenkins  46 y.o. F  Chief Complaint   Patient presents with   • Epigastric Pain     x 1 hour \"like heartburn but worse\"    • Back Pain     x2 hours \"like I need to crack my back\"      Vitals:    01/20/22 0111   BP: 128/78   Pulse: 93   Resp: 16   Temp: 36.4 °C (97.6 °F)   SpO2: 99%     Triage process explained to patient, apologized for wait time, and returned to lobby.  Pt informed to notify staff of any change in condition.     "

## 2022-01-21 ENCOUNTER — TELEPHONE (OUTPATIENT)
Dept: SCHEDULING | Facility: IMAGING CENTER | Age: 47
End: 2022-01-21

## 2022-01-22 LAB
BACTERIA UR CULT: ABNORMAL
BACTERIA UR CULT: ABNORMAL
SIGNIFICANT IND 70042: ABNORMAL
SITE SITE: ABNORMAL
SOURCE SOURCE: ABNORMAL

## 2022-01-23 NOTE — ED NOTES
ED Positive Culture Follow-up/Notification Note:    Date: 1/23/2022     Patient seen in the ED on 1/20/2022 for epigastric and back pain after eating. Reports no dysuria or hematuria.   1. Epigastric pain    2. Iron deficiency anemia, unspecified iron deficiency anemia type       Discharge Medication List as of 1/20/2022  9:10 AM      START taking these medications    Details   omeprazole (PRILOSEC) 20 MG delayed-release capsule Take 1 Capsule by mouth every day., Disp-30 Capsule, R-0, Normal             Allergies: Patient has no known allergies.     Vitals:    01/20/22 0745 01/20/22 0800 01/20/22 0830 01/20/22 0900   BP: 129/76 120/71 131/82 125/82   Pulse: 72 72 77 77   Resp: 16 16 16 16   Temp: 35.9 °C (96.6 °F) 35.8 °C (96.5 °F) 35.8 °C (96.5 °F) 36 °C (96.8 °F)   TempSrc: Temporal Temporal Temporal Temporal   SpO2: 98% 97% 97% 95%   Weight:       Height:           Final cultures:   Results     Procedure Component Value Units Date/Time    URINE CULTURE(NEW) [[id number]]  (Abnormal)  (Susceptibility) Collected: 01/20/22 0456    Order Status: Completed Specimen: Urine Updated: 01/22/22 1136     Significant Indicator POS     Source UR     Site -     Culture Result -      Escherichia coli  10-50,000 cfu/mL      Narrative:      Indication for culture:->Patient WITHOUT an indwelling Cross  catheter in place with new onset of Dysuria, Frequency,  Urgency, and/or Suprapubic pain    Susceptibility     Escherichia coli (1)     Antibiotic Interpretation Microscan   Method Status    Amikacin  [*]  Sensitive <=16 mcg/mL DEBBIE Final    Ampicillin Resistant >16 mcg/mL DEBBIE Final    Amoxicillin/CA  [*]  Sensitive <=8/4 mcg/mL DEBBIE Final    Aztreonam  [*]  Sensitive <=4 mcg/mL DEBBIE Final    Ceftolozane+Tazobactam  [*]  Sensitive <=2 mcg/mL DEBBIE Final    Ceftriaxone Sensitive <=1 mcg/mL DEBBIE Final    Ceftazidime  [*]  Sensitive <=1 mcg/mL DEBBIE Final    Cefazolin Sensitive <=2 mcg/mL DEBBIE Final    Ciprofloxacin Sensitive <=0.25 mcg/mL DEBBIE  Final    Cefepime Sensitive <=2 mcg/mL DEBBIE Final    Cefuroxime Sensitive <=4 mcg/mL DEBBIE Final    Ceftazidime+Avibactam  [*]  Sensitive <=4 mcg/mL DEBBIE Final    Ampicillin/sulbactam Sensitive <=4/2 mcg/mL DEBBIE Final    Ertapenem  [*]  Sensitive <=0.5 mcg/mL DEBBIE Final    Tobramycin Sensitive <=2 mcg/mL DEBBIE Final    Nitrofurantoin Sensitive <=32 mcg/mL DEBBIE Final    Gentamicin Sensitive <=2 mcg/mL DEBBIE Final    Imipenem  [*]  Sensitive <=1 mcg/mL DEBBIE Final    Levofloxacin Sensitive <=0.5 mcg/mL DEBBIE Final    Meropenem  [*]  Sensitive <=1 mcg/mL DEBBIE Final    Meropenem/Vaborbactam  [*]  Sensitive <=2 mcg/mL DEBBIE Final    Minocycline Sensitive <=4 mcg/mL DEBBIE Final    Pip/Tazobactam Sensitive <=8 mcg/mL DEBBIE Final    Trimeth/Sulfa Sensitive <=0.5/9.5 mcg/mL DEBBIE Final    Tetracycline  [*]  Sensitive <=4 mcg/mL DEBBIE Final    Tigecycline Sensitive <=2 mcg/mL DEBBIE Final           [*]  Suppressed Antibiotic                 URINALYSIS,CULTURE IF INDICATED [[id number]]  (Abnormal) Collected: 01/20/22 0456    Order Status: Completed Specimen: Urine Updated: 01/20/22 0515     Color Yellow     Character Clear     Specific Gravity 1.004     Ph 5.0     Glucose Negative mg/dL      Ketones Negative mg/dL      Protein Negative mg/dL      Bilirubin Negative     Urobilinogen, Urine 0.2     Nitrite Negative     Leukocyte Esterase Small     Occult Blood Negative     Micro Urine Req Microscopic    Narrative:      Indication for culture:->Patient WITHOUT an indwelling Cross  catheter in place with new onset of Dysuria, Frequency,  Urgency, and/or Suprapubic pain          Plan:   Patient without urinary symptoms, and felt improved after supportive care only during ED visit. Suspect E. Coli in urine culture is colonizer and not infectious, would not recommend antibiotic treatment. Patient not prescribed antibiotic therapy, no further recommendations at this time.    Alysa Segal, PharmD  PGY2 Infectious Diseases Pharmacy Resident

## 2022-10-13 ENCOUNTER — HOSPITAL ENCOUNTER (EMERGENCY)
Facility: MEDICAL CENTER | Age: 47
End: 2022-10-13
Attending: EMERGENCY MEDICINE
Payer: COMMERCIAL

## 2022-10-13 ENCOUNTER — APPOINTMENT (OUTPATIENT)
Dept: RADIOLOGY | Facility: MEDICAL CENTER | Age: 47
End: 2022-10-13
Attending: EMERGENCY MEDICINE
Payer: COMMERCIAL

## 2022-10-13 VITALS
SYSTOLIC BLOOD PRESSURE: 125 MMHG | BODY MASS INDEX: 38.39 KG/M2 | OXYGEN SATURATION: 99 % | WEIGHT: 224.87 LBS | HEART RATE: 76 BPM | RESPIRATION RATE: 16 BRPM | DIASTOLIC BLOOD PRESSURE: 65 MMHG | TEMPERATURE: 98.4 F | HEIGHT: 64 IN

## 2022-10-13 DIAGNOSIS — D64.9 ANEMIA, UNSPECIFIED TYPE: ICD-10-CM

## 2022-10-13 DIAGNOSIS — N93.9 VAGINAL BLEEDING: ICD-10-CM

## 2022-10-13 DIAGNOSIS — L03.116 CELLULITIS OF LEFT LOWER EXTREMITY: ICD-10-CM

## 2022-10-13 LAB
ABO GROUP BLD: NORMAL
ALBUMIN SERPL BCP-MCNC: 4.1 G/DL (ref 3.2–4.9)
ALBUMIN/GLOB SERPL: 1.6 G/DL
ALP SERPL-CCNC: 59 U/L (ref 30–99)
ALT SERPL-CCNC: 14 U/L (ref 2–50)
ANION GAP SERPL CALC-SCNC: 9 MMOL/L (ref 7–16)
ANISOCYTOSIS BLD QL SMEAR: ABNORMAL
AST SERPL-CCNC: 16 U/L (ref 12–45)
BARCODED ABORH UBTYP: 9500
BARCODED PRD CODE UBPRD: NORMAL
BARCODED UNIT NUM UBUNT: NORMAL
BASOPHILS # BLD AUTO: 1.7 % (ref 0–1.8)
BASOPHILS # BLD: 0.06 K/UL (ref 0–0.12)
BILIRUB SERPL-MCNC: 0.2 MG/DL (ref 0.1–1.5)
BLD GP AB SCN SERPL QL: NORMAL
BUN SERPL-MCNC: 7 MG/DL (ref 8–22)
CALCIUM SERPL-MCNC: 8.8 MG/DL (ref 8.5–10.5)
CHLORIDE SERPL-SCNC: 107 MMOL/L (ref 96–112)
CO2 SERPL-SCNC: 23 MMOL/L (ref 20–33)
COMPONENT R 8504R: NORMAL
CREAT SERPL-MCNC: 0.72 MG/DL (ref 0.5–1.4)
EOSINOPHIL # BLD AUTO: 0.19 K/UL (ref 0–0.51)
EOSINOPHIL NFR BLD: 5.1 % (ref 0–6.9)
ERYTHROCYTE [DISTWIDTH] IN BLOOD BY AUTOMATED COUNT: 46.9 FL (ref 35.9–50)
GFR SERPLBLD CREATININE-BSD FMLA CKD-EPI: 105 ML/MIN/1.73 M 2
GLOBULIN SER CALC-MCNC: 2.5 G/DL (ref 1.9–3.5)
GLUCOSE SERPL-MCNC: 111 MG/DL (ref 65–99)
HCT VFR BLD AUTO: 23.7 % (ref 37–47)
HGB BLD-MCNC: 6.4 G/DL (ref 12–16)
HYPOCHROMIA BLD QL SMEAR: ABNORMAL
LACTATE SERPL-SCNC: 0.9 MMOL/L (ref 0.5–2)
LYMPHOCYTES # BLD AUTO: 0.9 K/UL (ref 1–4.8)
LYMPHOCYTES NFR BLD: 23.7 % (ref 22–41)
MANUAL DIFF BLD: NORMAL
MCH RBC QN AUTO: 17.7 PG (ref 27–33)
MCHC RBC AUTO-ENTMCNC: 27 G/DL (ref 33.6–35)
MCV RBC AUTO: 65.5 FL (ref 81.4–97.8)
MICROCYTES BLD QL SMEAR: ABNORMAL
MONOCYTES # BLD AUTO: 0.35 K/UL (ref 0–0.85)
MONOCYTES NFR BLD AUTO: 9.3 % (ref 0–13.4)
MORPHOLOGY BLD-IMP: NORMAL
NEUTROPHILS # BLD AUTO: 2.29 K/UL (ref 2–7.15)
NEUTROPHILS NFR BLD: 60.2 % (ref 44–72)
NRBC # BLD AUTO: 0 K/UL
NRBC BLD-RTO: 0 /100 WBC
OVALOCYTES BLD QL SMEAR: NORMAL
PLATELET # BLD AUTO: 296 K/UL (ref 164–446)
PLATELET BLD QL SMEAR: NORMAL
PMV BLD AUTO: 10 FL (ref 9–12.9)
POIKILOCYTOSIS BLD QL SMEAR: NORMAL
POTASSIUM SERPL-SCNC: 4.2 MMOL/L (ref 3.6–5.5)
PRODUCT TYPE UPROD: NORMAL
PROT SERPL-MCNC: 6.6 G/DL (ref 6–8.2)
RBC # BLD AUTO: 3.62 M/UL (ref 4.2–5.4)
RBC BLD AUTO: PRESENT
RH BLD: NORMAL
SCHISTOCYTES BLD QL SMEAR: NORMAL
SODIUM SERPL-SCNC: 139 MMOL/L (ref 135–145)
TARGETS BLD QL SMEAR: NORMAL
UNIT STATUS USTAT: NORMAL
WBC # BLD AUTO: 3.8 K/UL (ref 4.8–10.8)

## 2022-10-13 PROCEDURE — 85025 COMPLETE CBC W/AUTO DIFF WBC: CPT

## 2022-10-13 PROCEDURE — 96367 TX/PROPH/DG ADDL SEQ IV INF: CPT

## 2022-10-13 PROCEDURE — 76881 US COMPL JOINT R-T W/IMG: CPT

## 2022-10-13 PROCEDURE — 96365 THER/PROPH/DIAG IV INF INIT: CPT

## 2022-10-13 PROCEDURE — 96366 THER/PROPH/DIAG IV INF ADDON: CPT

## 2022-10-13 PROCEDURE — 99284 EMERGENCY DEPT VISIT MOD MDM: CPT

## 2022-10-13 PROCEDURE — 36415 COLL VENOUS BLD VENIPUNCTURE: CPT

## 2022-10-13 PROCEDURE — 86901 BLOOD TYPING SEROLOGIC RH(D): CPT

## 2022-10-13 PROCEDURE — 80053 COMPREHEN METABOLIC PANEL: CPT

## 2022-10-13 PROCEDURE — 86923 COMPATIBILITY TEST ELECTRIC: CPT

## 2022-10-13 PROCEDURE — 86850 RBC ANTIBODY SCREEN: CPT

## 2022-10-13 PROCEDURE — 700111 HCHG RX REV CODE 636 W/ 250 OVERRIDE (IP): Performed by: EMERGENCY MEDICINE

## 2022-10-13 PROCEDURE — 83605 ASSAY OF LACTIC ACID: CPT

## 2022-10-13 PROCEDURE — 36430 TRANSFUSION BLD/BLD COMPNT: CPT

## 2022-10-13 PROCEDURE — P9016 RBC LEUKOCYTES REDUCED: HCPCS

## 2022-10-13 PROCEDURE — 85007 BL SMEAR W/DIFF WBC COUNT: CPT

## 2022-10-13 PROCEDURE — 87040 BLOOD CULTURE FOR BACTERIA: CPT

## 2022-10-13 PROCEDURE — 86900 BLOOD TYPING SEROLOGIC ABO: CPT

## 2022-10-13 PROCEDURE — 700105 HCHG RX REV CODE 258: Performed by: EMERGENCY MEDICINE

## 2022-10-13 PROCEDURE — 90715 TDAP VACCINE 7 YRS/> IM: CPT | Performed by: EMERGENCY MEDICINE

## 2022-10-13 PROCEDURE — 90471 IMMUNIZATION ADMIN: CPT

## 2022-10-13 RX ORDER — SULFAMETHOXAZOLE AND TRIMETHOPRIM 800; 160 MG/1; MG/1
1 TABLET ORAL 2 TIMES DAILY
Qty: 14 TABLET | Refills: 0 | Status: SHIPPED | OUTPATIENT
Start: 2022-10-13 | End: 2022-10-20

## 2022-10-13 RX ORDER — CEPHALEXIN 500 MG/1
500 CAPSULE ORAL 4 TIMES DAILY
Qty: 28 CAPSULE | Refills: 0 | Status: SHIPPED | OUTPATIENT
Start: 2022-10-13 | End: 2022-10-20

## 2022-10-13 RX ADMIN — CLOSTRIDIUM TETANI TOXOID ANTIGEN (FORMALDEHYDE INACTIVATED), CORYNEBACTERIUM DIPHTHERIAE TOXOID ANTIGEN (FORMALDEHYDE INACTIVATED), BORDETELLA PERTUSSIS TOXOID ANTIGEN (GLUTARALDEHYDE INACTIVATED), BORDETELLA PERTUSSIS FILAMENTOUS HEMAGGLUTININ ANTIGEN (FORMALDEHYDE INACTIVATED), BORDETELLA PERTUSSIS PERTACTIN ANTIGEN, AND BORDETELLA PERTUSSIS FIMBRIAE 2/3 ANTIGEN 0.5 ML: 5; 2; 2.5; 5; 3; 5 INJECTION, SUSPENSION INTRAMUSCULAR at 13:39

## 2022-10-13 RX ADMIN — VANCOMYCIN HYDROCHLORIDE 2500 MG: 500 INJECTION, POWDER, LYOPHILIZED, FOR SOLUTION INTRAVENOUS at 13:33

## 2022-10-13 RX ADMIN — SODIUM CHLORIDE 3 G: 900 INJECTION INTRAVENOUS at 12:09

## 2022-10-13 ASSESSMENT — FIBROSIS 4 INDEX: FIB4 SCORE: 0.45

## 2022-10-13 NOTE — ED PROVIDER NOTES
ED Provider Note    CHIEF COMPLAINT  Chief Complaint   Patient presents with    Dog Bite     Last week patient's dog bit the back of her left calf. 3 small wounds noted. Her dog is fully vaccinated    Fatigue     Feeling more fatigued and has been having period for 3 weeks. PMH anemia.          HPI  Sruthi Jenkins is a 47 y.o. female who presents with a chief complaint of dog bite to the left calf.  Patient states that she was bitten by her dog last week and since that time has been cleaning the area with alcohol and bacitracin and showering.  2 days ago though she noted a malodorous smell from the wound which has persisted and prompted her to come to the ER for evaluation.  Her dog is vaccinated against rabies.  She denies any fevers or systemic symptoms beyond fatigue.  She notes that she is currently being worked up for heavy vaginal bleeding by her gynecologist, Dr. Ivey who reportedly recommended a hysterectomy.  The patient has declined and is due to follow-up with her in 1 week to discuss other options.  She does note that her fatigue and occasional dizziness is baseline and not new.  She does have known anemia.    REVIEW OF SYSTEMS  See HPI for further details.  Dog bite.  Malodorous left lower extremity wound.  Fatigue.  Vaginal bleeding.  All other systems are negative.     PAST MEDICAL HISTORY       SOCIAL HISTORY  Social History     Tobacco Use    Smoking status: Some Days     Packs/day: 0.50     Types: Cigarettes    Smokeless tobacco: Never   Vaping Use    Vaping Use: Never used   Substance and Sexual Activity    Alcohol use: No    Drug use: No    Sexual activity: Not on file       SURGICAL HISTORY  patient denies any surgical history    CURRENT MEDICATIONS  Home Medications       Reviewed by Gaby Person R.N. (Registered Nurse) on 10/13/22 at 1021  Med List Status: Partial     Medication Last Dose Status   ascorbic acid (VITAMIN C) 500 MG tablet  Active   ferrous sulfate (FERROUSUL) 325 (65 Fe) MG  "tablet  Active   medroxyPROGESTERone (PROVERA) 10 MG Tab  Active   multivitamin (THERAGRAN) Tab  Active   omeprazole (PRILOSEC) 20 MG delayed-release capsule  Active                    ALLERGIES  No Known Allergies    PHYSICAL EXAM  VITAL SIGNS: /80   Pulse (!) 103   Temp 36.1 °C (97 °F) (Temporal)   Resp 16   Ht 1.626 m (5' 4\")   Wt 102 kg (224 lb 13.9 oz)   LMP 09/29/2022 (Approximate)   SpO2 100%   BMI 38.60 kg/m²    Pulse ox interpretation: I interpret this pulse ox as normal.  Constitutional: Alert in no apparent distress.  HENT: No signs of trauma, Bilateral external ears normal, Nose normal.  Moist mucous membranes.  Eyes: Pupils are equal and reactive, Conjunctiva normal, Non-icteric.   Neck: Normal range of motion, Supple, No stridor.   Lymphatic: No lymphadenopathy noted.   Cardiovascular: Tachycardic with regular rhythm rhythm, no murmurs. Pulses symmetrical.  Thorax & Lungs: Normal breath sounds, No respiratory distress, No wheezing, No chest tenderness.   Abdomen: Bowel sounds normal, Soft, No tenderness, No masses, No pulsatile masses. No peritoneal signs.  Skin: Warm, Dry, No erythema, No rash.   Back: Normal alignment.  Extremities: Intact distal pulses, No cyanosis.  2 scabbed wounds over the left calf without surrounding erythema or crepitus or fluctuance.  1 wound is pitted, approximately 2 cm, and draining malodorous purulent material.  There is associated tenderness but no fluctuance.  Musculoskeletal: Good range of motion in all major joints. No major deformities noted.   Neurologic: Alert, Normal motor function, Normal sensory function, No focal deficits noted.   Psychiatric: Affect normal, Judgment normal, Mood normal.     DIAGNOSTIC STUDIES / PROCEDURES    LABS  Results for orders placed or performed during the hospital encounter of 10/13/22   CBC With Differential   Result Value Ref Range    WBC 3.8 (L) 4.8 - 10.8 K/uL    RBC 3.62 (L) 4.20 - 5.40 M/uL    Hemoglobin 6.4 (L) " 12.0 - 16.0 g/dL    Hematocrit 23.7 (L) 37.0 - 47.0 %    MCV 65.5 (L) 81.4 - 97.8 fL    MCH 17.7 (L) 27.0 - 33.0 pg    MCHC 27.0 (L) 33.6 - 35.0 g/dL    RDW 46.9 35.9 - 50.0 fL    Platelet Count 296 164 - 446 K/uL    MPV 10.0 9.0 - 12.9 fL    Neutrophils-Polys 60.20 44.00 - 72.00 %    Lymphocytes 23.70 22.00 - 41.00 %    Monocytes 9.30 0.00 - 13.40 %    Eosinophils 5.10 0.00 - 6.90 %    Basophils 1.70 0.00 - 1.80 %    Nucleated RBC 0.00 /100 WBC    Neutrophils (Absolute) 2.29 2.00 - 7.15 K/uL    Lymphs (Absolute) 0.90 (L) 1.00 - 4.80 K/uL    Monos (Absolute) 0.35 0.00 - 0.85 K/uL    Eos (Absolute) 0.19 0.00 - 0.51 K/uL    Baso (Absolute) 0.06 0.00 - 0.12 K/uL    NRBC (Absolute) 0.00 K/uL    Hypochromia 1+     Anisocytosis 1+     Microcytosis 1+    Comp Metabolic Panel   Result Value Ref Range    Sodium 139 135 - 145 mmol/L    Potassium 4.2 3.6 - 5.5 mmol/L    Chloride 107 96 - 112 mmol/L    Co2 23 20 - 33 mmol/L    Anion Gap 9.0 7.0 - 16.0    Glucose 111 (H) 65 - 99 mg/dL    Bun 7 (L) 8 - 22 mg/dL    Creatinine 0.72 0.50 - 1.40 mg/dL    Calcium 8.8 8.5 - 10.5 mg/dL    AST(SGOT) 16 12 - 45 U/L    ALT(SGPT) 14 2 - 50 U/L    Alkaline Phosphatase 59 30 - 99 U/L    Total Bilirubin 0.2 0.1 - 1.5 mg/dL    Albumin 4.1 3.2 - 4.9 g/dL    Total Protein 6.6 6.0 - 8.2 g/dL    Globulin 2.5 1.9 - 3.5 g/dL    A-G Ratio 1.6 g/dL   Lactic Acid   Result Value Ref Range    Lactic Acid 0.9 0.5 - 2.0 mmol/L   ESTIMATED GFR   Result Value Ref Range    GFR (CKD-EPI) 105 >60 mL/min/1.73 m 2   DIFFERENTIAL MANUAL   Result Value Ref Range    Manual Diff Status PERFORMED    PERIPHERAL SMEAR REVIEW   Result Value Ref Range    Peripheral Smear Review see below    PLATELET ESTIMATE   Result Value Ref Range    Plt Estimation Normal    MORPHOLOGY   Result Value Ref Range    RBC Morphology Present     Poikilocytosis 1+     Ovalocytes 1+     Schistocytes 1+     Target Cells 1+    COD - Adult (Type and Screen)   Result Value Ref Range    ABO Grouping  Only O     Rh Grouping Only POS     Antibody Screen-Cod NEG     Component R       R99                 Red Cells, LR       [id number]   issued       10/13/22   15:48      Product Type R99     Dispense Status issued     Unit Number (Barcoded) [id number]     Product Code (Barcoded) A0575Y07     Blood Type (Barcoded) 9500      RADIOLOGY  US-EXTREMITY NON VASCULAR BILATERAL   Final Result      1.  No sonographic evidence of abscess or focal hematoma.        COURSE & MEDICAL DECISION MAKING  Pertinent Labs & Imaging studies reviewed. (See chart for details)  This is a 47-year-old female who is here with a dog bite to her left calf that she sustained last week.  She has 2 scabbed lesions without surrounding erythema but there is 1 laceration that is slightly deep with surrounding erythema and purulent drainage.  There is no fluctuance or crepitus to suggest abscess or necrotizing fasciitis.  She is neurovascularly intact to the left lower extremity.  She has full range of motion of the left ankle and all digits of the left foot.  There is no red streaking to suggest lymphangitis.    CBC is not reassuring with a white count of 3.8 and significant anemia with a hemoglobin of 6.4.  Her most recent hemoglobin was 6.7 in January 2022.  Metabolic panel reveals normal electrolytes, renal, and liver function.  Blood glucose is elevated to 111.  She has a normal lactate.  An ultrasound was obtained to evaluate for possible deeper abscess in this thankfully was reassuring without any focal fluid collection noted.    Regarding the anemia, I feel that this is most likely to be due to to her vaginal bleeding which is currently being worked up as an outpatient by Dr. Ivey.    I discussed the patient initially with Dr. Simpson, on-call for Dr. Ivey.  He does not take gynecology call and has requested that I contact our on-call gynecologist.    Patient was reevaluated at bedside.  Her heart rate has improved to the low 80s without  intervention.  Blood pressure remains normal.  Anemia was discussed with the patient which is most likely due to her heavy vaginal bleeding which again is currently being worked up as an outpatient.  I asked her if I could perform a pelvic exam to quantify the amount of bleeding but the patient has declined.  She does not want her vaginal bleeding addressed today.  She is aware that her anemia could potentially become life-threatening and instead would prefer to follow-up at her previously scheduled appointment with her OB/GYN in 1 week.  She notes that none of her symptoms today feel any different than her baseline and states that her vaginal bleeding has been ongoing for years and is unchanged.  I asked why she does not want to undergo hysterectomy as she is not planning on having additional children and she is unable to provide me with a reason other than she wants to discuss it further with Dr. Ivey.    We will plan on transfusion and subsequent discharge AGAINST MEDICAL ADVICE.    I have explained to the patient the risks and benefits of transfusion of blood products.  This includes, as appropriate, the risk of mild allergic reaction, hemolytic reaction, transfusion-associated lung injury, febrile reactions, circulatory or iron overload, and infection.    We discussed possible alternatives and their risks, including directed donation, autologous transfusion, and no transfusion, including IV or oral iron supplementation, as appropriate.  I believe the patient understands the risks and benefits and was able to express understanding.    Patient was transfused 1 unit of packed red blood cells.    The patient is leaving against medical advice.  I discussed with the patient the risks of leaving without receiving appropriate care to include permanent disability or death.  I have discussed possible alternatives.  The patient is not intoxicated.  The patient has capacity and understands the risks and benefits of their  decision.  While I certainly disagree with the patient's decision, I respect the patient's autonomy and will not keep them here against their will.  They understand that their decision to leave can be reversed at any time and they can return to us at any time for any reason at all.  Family (significant other) is involved with the discussion and also understands my concern.  I have asked the nurses to have the patient sign an AMA form and to witness this signature.    Patient was ultimately discharged in stable condition with very strict return precautions.  She was given prescriptions for Bactrim and Keflex for her left lower extremity cellulitis.  She will contact Dr. Ivey tomorrow to discuss her ER visit and she understands that it is imperative that she follow-up with Dr. Ivey in 1 week for recheck.      FINAL IMPRESSION  1. Vaginal bleeding        2. Anemia, unspecified type        3. Cellulitis of left lower extremity  sulfamethoxazole-trimethoprim (BACTRIM DS) 800-160 MG tablet    cephALEXin (KEFLEX) 500 MG Cap        Electronically signed by: Dre Diamond M.D., 10/13/2022 11:29 AM

## 2022-10-13 NOTE — ED NOTES
Assist RN:    Patient medicated per MAR. She was provided with food and warm blanket. Family at bedside

## 2022-10-13 NOTE — DISCHARGE INSTRUCTIONS
You were seen in the ER for a malodorous wound on your leg after a dog bite.  It does appear to be infected and you were given IV antibiotics.  Regarding your anemia which is quite significant, you received a blood transfusion today and I suspect that this is due to vaginal bleeding but you have declined to allow me to pursue a work-up for this including a pelvic exam or consultation of our on-call gynecologist.  You are being discharged AGAINST MEDICAL ADVICE.  Please contact your gynecologist tomorrow to discuss this ER visit and make sure you follow-up at your regularly scheduled appointment.  If you change your mind at any time you should return to the ER.  I have given you a prescription for antibiotics, please take them as directed.  Return immediately with any new or worsening symptoms.  I hope you feel better soon!

## 2022-10-13 NOTE — ED TRIAGE NOTES
"Chief Complaint   Patient presents with    Dog Bite     Last week patient's dog bit the back of her left calf. 3 small wounds noted. Her dog is fully vaccinated    Fatigue     Feeling more fatigued and has been having period for 3 weeks. PMH anemia.          Patient to triage ambulatory with a steady gait, AAOx4, Appropriate precautions in place.     Explained wait time and triage process. Placed back in lobby. Told to notify ED tech or RN of any changes, verbalized understanding.    /80   Pulse (!) 103   Temp 36.1 °C (97 °F) (Temporal)   Resp 16   Ht 1.626 m (5' 4\")   Wt 102 kg (224 lb 13.9 oz)   LMP 09/29/2022 (Approximate)   SpO2 100%   BMI 38.60 kg/m²     "

## 2022-10-18 LAB
BACTERIA BLD CULT: NORMAL
SIGNIFICANT IND 70042: NORMAL
SITE SITE: NORMAL
SOURCE SOURCE: NORMAL

## 2023-03-29 ENCOUNTER — APPOINTMENT (OUTPATIENT)
Dept: ADMISSIONS | Facility: MEDICAL CENTER | Age: 48
End: 2023-03-29
Attending: OBSTETRICS & GYNECOLOGY
Payer: COMMERCIAL

## 2023-03-30 ENCOUNTER — ANESTHESIA EVENT (OUTPATIENT)
Dept: SURGERY | Facility: MEDICAL CENTER | Age: 48
End: 2023-03-30
Payer: COMMERCIAL

## 2023-03-30 ENCOUNTER — PRE-ADMISSION TESTING (OUTPATIENT)
Dept: ADMISSIONS | Facility: MEDICAL CENTER | Age: 48
End: 2023-03-30
Attending: OBSTETRICS & GYNECOLOGY
Payer: COMMERCIAL

## 2023-03-31 ENCOUNTER — HOSPITAL ENCOUNTER (OUTPATIENT)
Facility: MEDICAL CENTER | Age: 48
End: 2023-03-31
Attending: OBSTETRICS & GYNECOLOGY | Admitting: OBSTETRICS & GYNECOLOGY
Payer: COMMERCIAL

## 2023-03-31 ENCOUNTER — ANESTHESIA (OUTPATIENT)
Dept: SURGERY | Facility: MEDICAL CENTER | Age: 48
End: 2023-03-31
Payer: COMMERCIAL

## 2023-03-31 VITALS
OXYGEN SATURATION: 100 % | BODY MASS INDEX: 36.32 KG/M2 | RESPIRATION RATE: 16 BRPM | WEIGHT: 212.74 LBS | HEIGHT: 64 IN | SYSTOLIC BLOOD PRESSURE: 120 MMHG | TEMPERATURE: 97 F | HEART RATE: 67 BPM | DIASTOLIC BLOOD PRESSURE: 69 MMHG

## 2023-03-31 DIAGNOSIS — G89.18 POSTOPERATIVE PAIN: ICD-10-CM

## 2023-03-31 LAB
ABO GROUP BLD: NORMAL
ALBUMIN SERPL BCP-MCNC: 4 G/DL (ref 3.2–4.9)
ALBUMIN/GLOB SERPL: 1.4 G/DL
ALP SERPL-CCNC: 64 U/L (ref 30–99)
ALT SERPL-CCNC: 6 U/L (ref 2–50)
ANION GAP SERPL CALC-SCNC: 11 MMOL/L (ref 7–16)
AST SERPL-CCNC: 11 U/L (ref 12–45)
BARCODED ABORH UBTYP: 5100
BARCODED ABORH UBTYP: 5100
BARCODED PRD CODE UBPRD: NORMAL
BARCODED PRD CODE UBPRD: NORMAL
BARCODED UNIT NUM UBUNT: NORMAL
BARCODED UNIT NUM UBUNT: NORMAL
BILIRUB SERPL-MCNC: 0.4 MG/DL (ref 0.1–1.5)
BLD GP AB SCN SERPL QL: NORMAL
BUN SERPL-MCNC: 9 MG/DL (ref 8–22)
CALCIUM ALBUM COR SERPL-MCNC: 8.3 MG/DL (ref 8.5–10.5)
CALCIUM SERPL-MCNC: 8.3 MG/DL (ref 8.5–10.5)
CHLORIDE SERPL-SCNC: 109 MMOL/L (ref 96–112)
CO2 SERPL-SCNC: 19 MMOL/L (ref 20–33)
COMPONENT R 8504R: NORMAL
COMPONENT R 8504R: NORMAL
CREAT SERPL-MCNC: 0.64 MG/DL (ref 0.5–1.4)
ERYTHROCYTE [DISTWIDTH] IN BLOOD BY AUTOMATED COUNT: 43.1 FL (ref 35.9–50)
ERYTHROCYTE [DISTWIDTH] IN BLOOD BY AUTOMATED COUNT: 43.4 FL (ref 35.9–50)
GFR SERPLBLD CREATININE-BSD FMLA CKD-EPI: 111 ML/MIN/1.73 M 2
GLOBULIN SER CALC-MCNC: 2.8 G/DL (ref 1.9–3.5)
GLUCOSE SERPL-MCNC: 116 MG/DL (ref 65–99)
HCG UR QL: NEGATIVE
HCT VFR BLD AUTO: 20.8 % (ref 37–47)
HCT VFR BLD AUTO: 21.4 % (ref 37–47)
HGB BLD-MCNC: 4.8 G/DL (ref 12–16)
HGB BLD-MCNC: 5 G/DL (ref 12–16)
MCH RBC QN AUTO: 13 PG (ref 27–33)
MCH RBC QN AUTO: 13.2 PG (ref 27–33)
MCHC RBC AUTO-ENTMCNC: 23.1 G/DL (ref 33.6–35)
MCHC RBC AUTO-ENTMCNC: 23.4 G/DL (ref 33.6–35)
MCV RBC AUTO: 56.3 FL (ref 81.4–97.8)
MCV RBC AUTO: 56.4 FL (ref 81.4–97.8)
PATHOLOGY CONSULT NOTE: NORMAL
PLATELET # BLD AUTO: 304 K/UL (ref 164–446)
PLATELET # BLD AUTO: 312 K/UL (ref 164–446)
PMV BLD AUTO: 9 FL (ref 9–12.9)
PMV BLD AUTO: 9.6 FL (ref 9–12.9)
POTASSIUM SERPL-SCNC: 3.6 MMOL/L (ref 3.6–5.5)
PRODUCT TYPE UPROD: NORMAL
PRODUCT TYPE UPROD: NORMAL
PROT SERPL-MCNC: 6.8 G/DL (ref 6–8.2)
RBC # BLD AUTO: 3.69 M/UL (ref 4.2–5.4)
RBC # BLD AUTO: 3.8 M/UL (ref 4.2–5.4)
RH BLD: NORMAL
SODIUM SERPL-SCNC: 139 MMOL/L (ref 135–145)
UNIT STATUS USTAT: NORMAL
UNIT STATUS USTAT: NORMAL
WBC # BLD AUTO: 4.5 K/UL (ref 4.8–10.8)
WBC # BLD AUTO: 4.6 K/UL (ref 4.8–10.8)

## 2023-03-31 PROCEDURE — 160046 HCHG PACU - 1ST 60 MINS PHASE II: Performed by: OBSTETRICS & GYNECOLOGY

## 2023-03-31 PROCEDURE — 160035 HCHG PACU - 1ST 60 MINS PHASE I: Performed by: OBSTETRICS & GYNECOLOGY

## 2023-03-31 PROCEDURE — 80053 COMPREHEN METABOLIC PANEL: CPT

## 2023-03-31 PROCEDURE — 700105 HCHG RX REV CODE 258: Performed by: OBSTETRICS & GYNECOLOGY

## 2023-03-31 PROCEDURE — 160025 RECOVERY II MINUTES (STATS): Performed by: OBSTETRICS & GYNECOLOGY

## 2023-03-31 PROCEDURE — 86850 RBC ANTIBODY SCREEN: CPT

## 2023-03-31 PROCEDURE — 86923 COMPATIBILITY TEST ELECTRIC: CPT

## 2023-03-31 PROCEDURE — A9270 NON-COVERED ITEM OR SERVICE: HCPCS | Performed by: ANESTHESIOLOGY

## 2023-03-31 PROCEDURE — 160036 HCHG PACU - EA ADDL 30 MINS PHASE I: Performed by: OBSTETRICS & GYNECOLOGY

## 2023-03-31 PROCEDURE — 88305 TISSUE EXAM BY PATHOLOGIST: CPT

## 2023-03-31 PROCEDURE — P9016 RBC LEUKOCYTES REDUCED: HCPCS | Mod: 91

## 2023-03-31 PROCEDURE — 85027 COMPLETE CBC AUTOMATED: CPT | Mod: 91

## 2023-03-31 PROCEDURE — 160009 HCHG ANES TIME/MIN: Performed by: OBSTETRICS & GYNECOLOGY

## 2023-03-31 PROCEDURE — 160029 HCHG SURGERY MINUTES - 1ST 30 MINS LEVEL 4: Performed by: OBSTETRICS & GYNECOLOGY

## 2023-03-31 PROCEDURE — 700102 HCHG RX REV CODE 250 W/ 637 OVERRIDE(OP): Performed by: ANESTHESIOLOGY

## 2023-03-31 PROCEDURE — 160002 HCHG RECOVERY MINUTES (STAT): Performed by: OBSTETRICS & GYNECOLOGY

## 2023-03-31 PROCEDURE — 36415 COLL VENOUS BLD VENIPUNCTURE: CPT

## 2023-03-31 PROCEDURE — 86901 BLOOD TYPING SEROLOGIC RH(D): CPT

## 2023-03-31 PROCEDURE — 00952 ANES VAG PX HYSTSC&/HSG: CPT | Performed by: ANESTHESIOLOGY

## 2023-03-31 PROCEDURE — 700111 HCHG RX REV CODE 636 W/ 250 OVERRIDE (IP): Performed by: ANESTHESIOLOGY

## 2023-03-31 PROCEDURE — 160041 HCHG SURGERY MINUTES - EA ADDL 1 MIN LEVEL 4: Performed by: OBSTETRICS & GYNECOLOGY

## 2023-03-31 PROCEDURE — 81025 URINE PREGNANCY TEST: CPT

## 2023-03-31 PROCEDURE — 160048 HCHG OR STATISTICAL LEVEL 1-5: Performed by: OBSTETRICS & GYNECOLOGY

## 2023-03-31 PROCEDURE — 86900 BLOOD TYPING SEROLOGIC ABO: CPT

## 2023-03-31 PROCEDURE — 700101 HCHG RX REV CODE 250: Performed by: OBSTETRICS & GYNECOLOGY

## 2023-03-31 PROCEDURE — 36430 TRANSFUSION BLD/BLD COMPNT: CPT

## 2023-03-31 PROCEDURE — 700101 HCHG RX REV CODE 250: Performed by: ANESTHESIOLOGY

## 2023-03-31 RX ORDER — HYDROMORPHONE HYDROCHLORIDE 1 MG/ML
0.2 INJECTION, SOLUTION INTRAMUSCULAR; INTRAVENOUS; SUBCUTANEOUS
Status: DISCONTINUED | OUTPATIENT
Start: 2023-03-31 | End: 2023-03-31 | Stop reason: HOSPADM

## 2023-03-31 RX ORDER — DEXAMETHASONE SODIUM PHOSPHATE 4 MG/ML
INJECTION, SOLUTION INTRA-ARTICULAR; INTRALESIONAL; INTRAMUSCULAR; INTRAVENOUS; SOFT TISSUE PRN
Status: DISCONTINUED | OUTPATIENT
Start: 2023-03-31 | End: 2023-03-31 | Stop reason: SURG

## 2023-03-31 RX ORDER — HYDRALAZINE HYDROCHLORIDE 20 MG/ML
5 INJECTION INTRAMUSCULAR; INTRAVENOUS
Status: DISCONTINUED | OUTPATIENT
Start: 2023-03-31 | End: 2023-03-31 | Stop reason: HOSPADM

## 2023-03-31 RX ORDER — MEPERIDINE HYDROCHLORIDE 25 MG/ML
12.5 INJECTION INTRAMUSCULAR; INTRAVENOUS; SUBCUTANEOUS
Status: DISCONTINUED | OUTPATIENT
Start: 2023-03-31 | End: 2023-03-31 | Stop reason: HOSPADM

## 2023-03-31 RX ORDER — PHENYLEPHRINE HYDROCHLORIDE 10 MG/ML
INJECTION, SOLUTION INTRAMUSCULAR; INTRAVENOUS; SUBCUTANEOUS PRN
Status: DISCONTINUED | OUTPATIENT
Start: 2023-03-31 | End: 2023-03-31 | Stop reason: SURG

## 2023-03-31 RX ORDER — HYDROMORPHONE HYDROCHLORIDE 2 MG/ML
INJECTION, SOLUTION INTRAMUSCULAR; INTRAVENOUS; SUBCUTANEOUS PRN
Status: DISCONTINUED | OUTPATIENT
Start: 2023-03-31 | End: 2023-03-31 | Stop reason: SURG

## 2023-03-31 RX ORDER — ONDANSETRON 2 MG/ML
4 INJECTION INTRAMUSCULAR; INTRAVENOUS
Status: DISCONTINUED | OUTPATIENT
Start: 2023-03-31 | End: 2023-03-31 | Stop reason: HOSPADM

## 2023-03-31 RX ORDER — DEXMEDETOMIDINE HYDROCHLORIDE 100 UG/ML
INJECTION, SOLUTION INTRAVENOUS PRN
Status: DISCONTINUED | OUTPATIENT
Start: 2023-03-31 | End: 2023-03-31 | Stop reason: SURG

## 2023-03-31 RX ORDER — SODIUM CHLORIDE, SODIUM LACTATE, POTASSIUM CHLORIDE, CALCIUM CHLORIDE 600; 310; 30; 20 MG/100ML; MG/100ML; MG/100ML; MG/100ML
INJECTION, SOLUTION INTRAVENOUS CONTINUOUS
Status: DISCONTINUED | OUTPATIENT
Start: 2023-03-31 | End: 2023-03-31 | Stop reason: HOSPADM

## 2023-03-31 RX ORDER — EPHEDRINE SULFATE 50 MG/ML
5 INJECTION, SOLUTION INTRAVENOUS
Status: DISCONTINUED | OUTPATIENT
Start: 2023-03-31 | End: 2023-03-31 | Stop reason: HOSPADM

## 2023-03-31 RX ORDER — PROMETHAZINE HYDROCHLORIDE 25 MG/1
25 SUPPOSITORY RECTAL EVERY 4 HOURS PRN
Status: DISCONTINUED | OUTPATIENT
Start: 2023-03-31 | End: 2023-03-31 | Stop reason: HOSPADM

## 2023-03-31 RX ORDER — CEFAZOLIN SODIUM 1 G/3ML
INJECTION, POWDER, FOR SOLUTION INTRAMUSCULAR; INTRAVENOUS PRN
Status: DISCONTINUED | OUTPATIENT
Start: 2023-03-31 | End: 2023-03-31 | Stop reason: SURG

## 2023-03-31 RX ORDER — ONDANSETRON 2 MG/ML
INJECTION INTRAMUSCULAR; INTRAVENOUS PRN
Status: DISCONTINUED | OUTPATIENT
Start: 2023-03-31 | End: 2023-03-31 | Stop reason: SURG

## 2023-03-31 RX ORDER — CELECOXIB 200 MG/1
200 CAPSULE ORAL ONCE
Status: COMPLETED | OUTPATIENT
Start: 2023-03-31 | End: 2023-03-31

## 2023-03-31 RX ORDER — MIDAZOLAM HYDROCHLORIDE 1 MG/ML
1 INJECTION INTRAMUSCULAR; INTRAVENOUS
Status: DISCONTINUED | OUTPATIENT
Start: 2023-03-31 | End: 2023-03-31 | Stop reason: HOSPADM

## 2023-03-31 RX ORDER — SODIUM CHLORIDE, SODIUM LACTATE, POTASSIUM CHLORIDE, CALCIUM CHLORIDE 600; 310; 30; 20 MG/100ML; MG/100ML; MG/100ML; MG/100ML
INJECTION, SOLUTION INTRAVENOUS CONTINUOUS
Status: ACTIVE | OUTPATIENT
Start: 2023-03-31 | End: 2023-03-31

## 2023-03-31 RX ORDER — HALOPERIDOL 5 MG/ML
1 INJECTION INTRAMUSCULAR
Status: DISCONTINUED | OUTPATIENT
Start: 2023-03-31 | End: 2023-03-31 | Stop reason: HOSPADM

## 2023-03-31 RX ORDER — METOCLOPRAMIDE HYDROCHLORIDE 5 MG/ML
INJECTION INTRAMUSCULAR; INTRAVENOUS PRN
Status: DISCONTINUED | OUTPATIENT
Start: 2023-03-31 | End: 2023-03-31 | Stop reason: SURG

## 2023-03-31 RX ORDER — SCOLOPAMINE TRANSDERMAL SYSTEM 1 MG/1
1 PATCH, EXTENDED RELEASE TRANSDERMAL
Status: DISCONTINUED | OUTPATIENT
Start: 2023-03-31 | End: 2023-03-31 | Stop reason: HOSPADM

## 2023-03-31 RX ORDER — HYDROMORPHONE HYDROCHLORIDE 1 MG/ML
0.4 INJECTION, SOLUTION INTRAMUSCULAR; INTRAVENOUS; SUBCUTANEOUS
Status: DISCONTINUED | OUTPATIENT
Start: 2023-03-31 | End: 2023-03-31 | Stop reason: HOSPADM

## 2023-03-31 RX ORDER — DIPHENHYDRAMINE HYDROCHLORIDE 50 MG/ML
12.5 INJECTION INTRAMUSCULAR; INTRAVENOUS
Status: DISCONTINUED | OUTPATIENT
Start: 2023-03-31 | End: 2023-03-31 | Stop reason: HOSPADM

## 2023-03-31 RX ORDER — LABETALOL HYDROCHLORIDE 5 MG/ML
5 INJECTION, SOLUTION INTRAVENOUS
Status: DISCONTINUED | OUTPATIENT
Start: 2023-03-31 | End: 2023-03-31 | Stop reason: HOSPADM

## 2023-03-31 RX ORDER — OXYCODONE HYDROCHLORIDE AND ACETAMINOPHEN 5; 325 MG/1; MG/1
1 TABLET ORAL EVERY 6 HOURS PRN
Qty: 15 TABLET | Refills: 0 | Status: SHIPPED | OUTPATIENT
Start: 2023-03-31 | End: 2023-04-05

## 2023-03-31 RX ORDER — MIDAZOLAM HYDROCHLORIDE 1 MG/ML
INJECTION INTRAMUSCULAR; INTRAVENOUS PRN
Status: DISCONTINUED | OUTPATIENT
Start: 2023-03-31 | End: 2023-03-31 | Stop reason: SURG

## 2023-03-31 RX ORDER — HYDROMORPHONE HYDROCHLORIDE 1 MG/ML
0.1 INJECTION, SOLUTION INTRAMUSCULAR; INTRAVENOUS; SUBCUTANEOUS
Status: DISCONTINUED | OUTPATIENT
Start: 2023-03-31 | End: 2023-03-31 | Stop reason: HOSPADM

## 2023-03-31 RX ORDER — LIDOCAINE HYDROCHLORIDE 20 MG/ML
INJECTION, SOLUTION EPIDURAL; INFILTRATION; INTRACAUDAL; PERINEURAL PRN
Status: DISCONTINUED | OUTPATIENT
Start: 2023-03-31 | End: 2023-03-31 | Stop reason: SURG

## 2023-03-31 RX ORDER — IBUPROFEN 800 MG/1
800 TABLET ORAL EVERY 8 HOURS PRN
Qty: 30 TABLET | Refills: 1 | Status: ON HOLD | OUTPATIENT
Start: 2023-03-31 | End: 2024-02-19

## 2023-03-31 RX ORDER — ACETAMINOPHEN 500 MG
1000 TABLET ORAL ONCE
Status: COMPLETED | OUTPATIENT
Start: 2023-03-31 | End: 2023-03-31

## 2023-03-31 RX ADMIN — FENTANYL CITRATE 50 MCG: 50 INJECTION, SOLUTION INTRAMUSCULAR; INTRAVENOUS at 13:48

## 2023-03-31 RX ADMIN — METOCLOPRAMIDE 10 MG: 5 INJECTION, SOLUTION INTRAMUSCULAR; INTRAVENOUS at 13:48

## 2023-03-31 RX ADMIN — DEXMEDETOMIDINE 10 MCG: 200 INJECTION, SOLUTION INTRAVENOUS at 13:50

## 2023-03-31 RX ADMIN — SODIUM CHLORIDE, POTASSIUM CHLORIDE, SODIUM LACTATE AND CALCIUM CHLORIDE: 600; 310; 30; 20 INJECTION, SOLUTION INTRAVENOUS at 13:48

## 2023-03-31 RX ADMIN — PROPOFOL 50 MG: 10 INJECTION, EMULSION INTRAVENOUS at 13:53

## 2023-03-31 RX ADMIN — FENTANYL CITRATE 50 MCG: 50 INJECTION, SOLUTION INTRAMUSCULAR; INTRAVENOUS at 13:58

## 2023-03-31 RX ADMIN — PROPOFOL 20 MG: 10 INJECTION, EMULSION INTRAVENOUS at 13:51

## 2023-03-31 RX ADMIN — PHENYLEPHRINE HYDROCHLORIDE 100 MCG: 10 INJECTION INTRAVENOUS at 14:04

## 2023-03-31 RX ADMIN — HYDROMORPHONE HYDROCHLORIDE 0.5 MG: 2 INJECTION INTRAMUSCULAR; INTRAVENOUS; SUBCUTANEOUS at 14:18

## 2023-03-31 RX ADMIN — MIDAZOLAM HYDROCHLORIDE 2 MG: 1 INJECTION, SOLUTION INTRAMUSCULAR; INTRAVENOUS at 13:48

## 2023-03-31 RX ADMIN — LIDOCAINE HYDROCHLORIDE 50 MG: 20 INJECTION, SOLUTION EPIDURAL; INFILTRATION; INTRACAUDAL at 13:50

## 2023-03-31 RX ADMIN — PROPOFOL 20 MG: 10 INJECTION, EMULSION INTRAVENOUS at 13:52

## 2023-03-31 RX ADMIN — CEFAZOLIN 2 G: 330 INJECTION, POWDER, FOR SOLUTION INTRAMUSCULAR; INTRAVENOUS at 13:48

## 2023-03-31 RX ADMIN — PROPOFOL 20 MG: 10 INJECTION, EMULSION INTRAVENOUS at 13:50

## 2023-03-31 RX ADMIN — ACETAMINOPHEN 1000 MG: 500 TABLET, FILM COATED ORAL at 10:36

## 2023-03-31 RX ADMIN — ONDANSETRON 4 MG: 2 INJECTION INTRAMUSCULAR; INTRAVENOUS at 13:48

## 2023-03-31 RX ADMIN — SCOPALAMINE 1 PATCH: 1 PATCH, EXTENDED RELEASE TRANSDERMAL at 10:36

## 2023-03-31 RX ADMIN — SODIUM CHLORIDE, POTASSIUM CHLORIDE, SODIUM LACTATE AND CALCIUM CHLORIDE: 600; 310; 30; 20 INJECTION, SOLUTION INTRAVENOUS at 10:30

## 2023-03-31 RX ADMIN — DEXAMETHASONE SODIUM PHOSPHATE 8 MG: 4 INJECTION, SOLUTION INTRA-ARTICULAR; INTRALESIONAL; INTRAMUSCULAR; INTRAVENOUS; SOFT TISSUE at 13:56

## 2023-03-31 RX ADMIN — CELECOXIB 200 MG: 200 CAPSULE ORAL at 10:36

## 2023-03-31 ASSESSMENT — PAIN DESCRIPTION - PAIN TYPE
TYPE: SURGICAL PAIN

## 2023-03-31 ASSESSMENT — FIBROSIS 4 INDEX: FIB4 SCORE: 0.65

## 2023-03-31 NOTE — OR NURSING
As per surgeon's note:  D/C to home when tolerating PO and voids spontaneously  Pelvic rest x 6 weeks  F/U in 2 weeks    Received pt A x 4  VSS in Ra, sats 100%  Ambulatory and independent w adls  As per PACU RN hand off no need for repeat blood draw  Able to void at 1540, pink tinged urine, no clots noted  Tolerating liquids well  Nil pain/n/v  Very scant vaginal bleed  Happy to go home

## 2023-03-31 NOTE — ANESTHESIA TIME REPORT
Anesthesia Start and Stop Event Times     Date Time Event    3/31/2023 1252 Ready for Procedure     1348 Anesthesia Start     1509 Anesthesia Stop        Responsible Staff  03/31/23    Name Role Begin End    Lauri Severino M.D. Anesth 1348 1509        Overtime Reason:  no overtime (within assigned shift)    Comments:

## 2023-03-31 NOTE — OP REPORT
DATE OF SERVICE:  3/31/23    PREOPERATIVE DIAGNOSIS:    Abnormal uterine bleeding.  Anemia    POSTOPERATIVE DIAGNOSIS:   Abnormal uterine bleeding.  Anemia    PROCEDURES PERFORMED:  1.  Dilation and curettage.  2.  Operative Hysteroscopic with roller ball ablation   3.  Intraoperative transfusion of PRBC x 2 units    SURGEON:  Taina Ivey MD    ANESTHESIA:  General endotracheal anesthesia.    ANESTHESIOLOGIST:  CAROLYN Severino MD    SPECIMEN:  Endometrial curette.    ESTIMATED BLOOD LOSS:  Less than 5 mL.    FINDINGS:  Proliferative thick endometrial lining with active uterine bleeding.    COMPLICATIONS:  None.    PROCEDURE:  After informed consent was obtained, the patient was taken to the   operating room where general endotracheal anesthesia was applied without any   complications.  The patient was then prepped and draped in the usual sterile   manner in the lithotomy position with Jacky stirrups.  A formal time-out was   called prior to the procedure and preoperative antibiotics were given,   examination under anesthesia was performed and the patient was noted to have a   mildly enlarged uterus. The uterus was sounded to 10 cm. A heavy weighted   Duckworth speculum was then placed into the posterior vaginal vault and a right angle   retractor anteriorly in order to visualize the cervix.  The cervix was grasped at the   12 o'clock position with a single tooth tenaculum.  The cervical os was then   progressively dilated and a smooth curettage x3 was performed.      Sample was sent to pathology.     The procedure then went to the Operative hysteroscopy.     The cervical os was then progressively dilated further to accommodate the    hysteroscopic system.  The hysteroscope was then introduced into the uterine   cavity under direct visualization and then the procedure was started. A hysteroscopic   examination was initially performed showing a thick proliferative endometrium but   no injury to the uterus. The ablation was  then started using the roller ball under direct   visualization. All surfaces of the endometrium was cauterized with the roller ball. There   was no perforation of the uterus. Hemostasis was noted. The hysteroscopy was removed.  The single tooth tenaculum was also removed.  There was noted to be some slight   bleeding at the tenaculum site, which was stopped with silver nitrate.      Sorbitol was used and there was not a deficit in the I&O.    Sponge, needle, instrument, and lap counts were correct x2.  Patient tolerated the procedure   well and went to recovery room in stable condition.    Pt also secondary to H/H 4.8/20.8 received 2 units of PRBC started prior to the procedure and continued intraop.

## 2023-03-31 NOTE — DISCHARGE INSTRUCTIONS
HOME CARE INSTRUCTIONS    ACTIVITY: Rest and take it easy for the first 24 hours.  A responsible adult is recommended to remain with you during that time.  It is normal to feel sleepy.  We encourage you to not do anything that requires balance, judgment or coordination.    FOR 24 HOURS DO NOT:  Drive, operate machinery or run household appliances.  Drink beer or alcoholic beverages.    DIET: To avoid nausea, slowly advance diet as tolerated, avoiding spicy or greasy foods for the first day.  Add more substantial food to your diet according to your physician's instructions.  Babies can be fed formula or breast milk as soon as they are hungry.  INCREASE FLUIDS AND FIBER TO AVOID CONSTIPATION.        MEDICATIONS: Resume taking daily medication.  Take prescribed pain medication with food.  If no medication is prescribed, you may take non-aspirin pain medication if needed.  PAIN MEDICATION CAN BE VERY CONSTIPATING.  Take a stool softener or laxative such as senokot, pericolace, or milk of magnesia if needed.    Prescription given for percocet and ibuprofen.  Last pain medication given at 1418.    A follow-up appointment should be arranged with your doctor in 1-2 wks; call to schedule.    You should CALL YOUR PHYSICIAN if you develop:  Fever greater than 101 degrees F.  Pain not relieved by medication, or persistent nausea or vomiting.  Excessive bleeding (blood soaking through dressing) or unexpected drainage from the wound.  Extreme redness or swelling around the incision site, drainage of pus or foul smelling drainage.  Inability to urinate or empty your bladder within 8 hours.  Problems with breathing or chest pain.    You should call 911 if you develop problems with breathing or chest pain.  If you are unable to contact your doctor or surgical center, you should go to the nearest emergency room or urgent care center.  Physician's telephone #: [phone]    MILD FLU-LIKE SYMPTOMS ARE NORMAL.  YOU MAY EXPERIENCE  GENERALIZED MUSCLE ACHES, THROAT IRRITATION, HEADACHE AND/OR SOME NAUSEA.    If any questions arise, call your doctor.  If your doctor is not available, please feel free to call the Surgical Center at [phone].  The Center is open Monday through Friday from 7AM to 7PM.      A registered nurse may call you a few days after your surgery to see how you are doing after your procedure.    You may also receive a survey in the mail within the next two weeks and we ask that you take a few moments to complete the survey and return it to us.  Our goal is to provide you with very good care and we value your comments.     Depression / Suicide Risk    As you are discharged from this [facility] facility, it is important to learn how to keep safe from harming yourself.    Recognize the warning signs:  Abrupt changes in personality, positive or negative- including increase in energy   Giving away possessions  Change in eating patterns- significant weight changes-  positive or negative  Change in sleeping patterns- unable to sleep or sleeping all the time   Unwillingness or inability to communicate  Depression  Unusual sadness, discouragement and loneliness  Talk of wanting to die  Neglect of personal appearance   Rebelliousness- reckless behavior  Withdrawal from people/activities they love  Confusion- inability to concentrate     If you or a loved one observes any of these behaviors or has concerns about self-harm, here's what you can do:  Talk about it- your feelings and reasons for harming yourself  Remove any means that you might use to hurt yourself (examples: pills, rope, extension cords, firearm)  Get professional help from the community (Mental Health, Substance Abuse, psychological counseling)  Do not be alone:Call your Safe Contact- someone whom you trust who will be there for you.  Call your local CRISIS HOTLINE 788-1917 or 265-139-1333  Call your local Children's Mobile Crisis Response Team [facility]  (252) 291-4581 or [employer]  Call the toll free National Suicide Prevention Hotlines   National Suicide Prevention Lifeline 529-874-GGWO (3885)  [facility] 800-SUICIDE (005-3739)    I acknowledge receipt and understanding of these Home Care instructions.    Dilation and Curettage or Vacuum Curettage, Care After  This sheet gives you information about how to care for yourself after your procedure. Your health care provider may also give you more specific instructions. If you have problems or questions, contact your health care provider.  What can I expect after the procedure?  After your procedure, it is common to have:  Mild pain or cramping.  Some vaginal bleeding or spotting.  These may last for up to 2 weeks after your procedure.  Follow these instructions at home:  Activity  Do not drive or use heavy machinery while taking prescription pain medicine.  Avoid driving for the first 24 hours after your procedure.  Take frequent, short walks, followed by rest periods, throughout the day. Ask your health care provider what activities are safe for you. After 1-2 days, you may be able to return to your normal activities.  Do not lift anything heavier than 10 lb (4.5 kg) until your health care provider approves.  For at least 2 weeks, or as long as told by your health care provider, do not:  Douche.  Use tampons.  Have sexual intercourse.  General instructions    Take over-the-counter and prescription medicines only as told by your health care provider. This is especially important if you take blood thinning medicine.  Do not take baths, swim, or use a hot tub until your health care provider approves. Take showers instead of baths.  Wear compression stockings as told by your health care provider. These stockings help to prevent blood clots and reduce swelling in your legs.  It is your responsibility to get the results of your procedure. Ask your health care provider, or the department performing  the procedure, when your results will be ready.  Keep all follow-up visits as told by your health care provider. This is important.  Contact a health care provider if:  You have severe cramps that get worse or that do not get better with medicine.  You have severe abdominal pain.  You cannot drink fluids without vomiting.  You develop pain in a different area of your pelvis.  You have bad-smelling vaginal discharge.  You have a rash.  Get help right away if:  You have vaginal bleeding that soaks more than one sanitary pad in 1 hour, for 2 hours in a row.  You pass large blood clots from your vagina.  You have a fever that is above 100.4°F (38.0°C).  Your abdomen feels very tender or hard.  You have chest pain.  You have shortness of breath.  You cough up blood.  You feel dizzy or light-headed.  You faint.  You have pain in your neck or shoulder area.  This information is not intended to replace advice given to you by your health care provider. Make sure you discuss any questions you have with your health care provider.  Document Released: 12/15/2001 Document Revised: 11/30/2018 Document Reviewed: 07/20/2017  [employer] Patient Education © 2020 [employer]

## 2023-03-31 NOTE — ANESTHESIA PROCEDURE NOTES
Airway    Date/Time: 3/31/2023 1:55 PM  Performed by: Lauri Severino M.D.  Authorized by: Lauri Severino M.D.     Location:  OR  Urgency:  Elective  Indications for Airway Management:  Anesthesia      Spontaneous Ventilation: absent    Sedation Level:  Deep  Preoxygenated: Yes    Final Airway Type:  Supraglottic airway  Final Supraglottic Airway:  Standard LMA    SGA Size:  4  Number of Attempts at Approach:  1   Atraumatic x1

## 2023-03-31 NOTE — OR NURSING
NPO , allergies and surgical consent verified and signed by the patient with KALYN Samano at the bedside. Patient consented to talk about her medical condition and plan with her BF . IV established at right AC g18 and blood sent to lab . Started on 1 L LR and regulated as ordered infusing well. Patient on bed resting with no complain.

## 2023-03-31 NOTE — ANESTHESIA POSTPROCEDURE EVALUATION
Patient: Sruthi Jenkins    Procedure Summary     Date: 03/31/23 Room / Location: [place] [id number] / SURGERY [facility]    Anesthesia Start: 1348 Anesthesia Stop: 1509    Procedure: DILATION AND CURETTAGE, OPERATIVE HYSTEROSCOPY (Vagina ) Diagnosis: (ABNORMAL UTERINE BLEEDING, SEVERE ANEMIA)    Surgeons: Taina Ivey M.D. Responsible Provider: Lauri Severino M.D.    Anesthesia Type: general ASA Status: 3          Final Anesthesia Type: general  Last vitals  BP   Blood Pressure: 118/77    Temp   36.3 °C (97.3 °F)    Pulse   64   Resp   13    SpO2   96 %      Anesthesia Post Evaluation    Patient location during evaluation: PACU  Patient participation: complete - patient participated  Level of consciousness: awake and alert    Airway patency: patent  Anesthetic complications: no  Cardiovascular status: hemodynamically stable  Respiratory status: acceptable  Hydration status: euvolemic    PONV: none          No notable events documented.     Nurse Pain Score: 0 (NPRS)

## 2023-03-31 NOTE — OR SURGEON
Immediate Post OP Note    PreOp Diagnosis:   AUB  2.   Anemia    PostOp Diagnosis:   AUB  2.   Anemia    Procedure(s):  DILATION AND CURETTAGE  OPERATIVE HYSTEROSCOPY WITH ROLLER BALL ABALATION  INTRA OP BLOOD TRANSFUSION X 2 UNITS PRBC    Surgeon(s):  Taina Ivey M.D.    Anesthesiologist/Type of Anesthesia:  Anesthesiologist: Lauri Severino M.D./GRIFFIN    Specimens removed if any:  ENDOMETRIAL CURETTAGE    Estimated Blood Loss: minimal    Findings: Proliferative thick endometrial lining with active bleeding    Complications: None        3/31/2023 12:37 PM Taina Ivey M.D.

## 2023-03-31 NOTE — OR NURSING
First unit of PRBC verified with ulises Rene and started with v/s signs monitoring. Watched for blood transfusion reaction.

## 2023-03-31 NOTE — OR NURSING
Second sample of CBC sent as ordered by Dr. Severino after relaying to him latest HGB  of 5.0 and HCT of 21.4.

## 2023-03-31 NOTE — ANESTHESIA PREPROCEDURE EVALUATION
Case: [id number] Date/Time: 23 1115    Procedures:       DILATION AND CURETTAGE, HYDROTHERMAL ABLATION      ABLATION, ENDOMETRIUM, THERMAL, HYSTEROSCOPIC    Pre-op diagnosis: ABNORMAL UTERINE BLEEDING, ANEMIA    Location: [facility] OR  / SURGERY [facility]    Surgeons: OSIEL Elam H&P:  PAST MEDICAL HISTORY:   47 y.o. female who presents for Procedure(s):  DILATION AND CURETTAGE, HYDROTHERMAL ABLATION  ABLATION, ENDOMETRIUM, THERMAL, HYSTEROSCOPIC.  She has current and past medical problems significant for:    Chronic anemia, hgb 5.0 this AM. Transfusing 2u pRBC    Patient denies history of previous MI, chest pain or shortness of breath  Able to climb 2 flights of stair without dyspnea or angina, > 4 METs     Patient denies history of complications with anesthesia    Past Medical History:   Diagnosis Date   • Anemia    • Diabetes (HCC)     prediabetic   • Gynecological disorder     Recurent and heavy periods   • Heart burn        SMOKING/ALCOHOL/RECREATIONAL DRUG USE:  Social History     Tobacco Use   • Smoking status: Former     Packs/day: 0.50     Years: 10.00     Pack years: 5.00     Types: Cigarettes     Quit date: 10/1/2022     Years since quittin.4   • Smokeless tobacco: Never   Vaping Use   • Vaping Use: Never used   Substance Use Topics   • Alcohol use: Yes     Alcohol/week: 0.6 oz     Types: 1 Glasses of wine per week   • Drug use: No     Social History     Substance and Sexual Activity   Drug Use No       PAST SURGICAL HISTORY:  History reviewed. No pertinent surgical history.    ALLERGIES:   No Known Allergies    MEDICATIONS:  No current facility-administered medications on file prior to encounter.     Current Outpatient Medications on File Prior to Encounter   Medication Sig Dispense Refill   • ferrous sulfate (FERROUSUL) 325 (65 Fe) MG tablet Take 1 Tablet by mouth every day. (Patient not taking: Reported on 3/30/2023) 90 Tablet 0   • omeprazole (PRILOSEC) 20 MG delayed-release  capsule Take 1 Capsule by mouth every day. (Patient not taking: Reported on 3/30/2023) 30 Capsule 0   • medroxyPROGESTERone (PROVERA) 10 MG Tab Take 1 tablet by mouth every day. 10 tablet 0   • ascorbic acid (VITAMIN C) 500 MG tablet Take 1 Tab by mouth every day. Daily with Iron supplement (Patient not taking: Reported on 3/30/2023) 90 Tab 2   • multivitamin (THERAGRAN) Tab Take 1 Tab by mouth every day. (Patient not taking: Reported on 3/30/2023)         LABS:  Lab Results   Component Value Date/Time    HEMOGLOBIN 5.0 (LL) 03/31/2023 1025    HEMATOCRIT 21.4 (L) 03/31/2023 1025    WBC 4.6 (L) 03/31/2023 1025     Lab Results   Component Value Date/Time    SODIUM 139 03/31/2023 1025    POTASSIUM 3.6 03/31/2023 1025    CHLORIDE 109 03/31/2023 1025    CO2 19 (L) 03/31/2023 1025    GLUCOSE 116 (H) 03/31/2023 1025    BUN 9 03/31/2023 1025    CALCIUM 8.3 (L) 03/31/2023 1025         PREVIOUS ANESTHETICS: See EMR  __________________________________________    Relevant Problems   No relevant active problems       Physical Exam    Airway   Mallampati: II  TM distance: >3 FB  Neck ROM: full       Cardiovascular - normal exam  Rhythm: regular  Rate: normal  (-) murmur     Dental - normal exam           Pulmonary - normal exam  Breath sounds clear to auscultation     Abdominal   (+) obese     Neurological - normal exam                 Anesthesia Plan    ASA 3 (chronic anemia, hgb 5.0)       Plan - general       Airway plan will be LMA          Induction: intravenous    Postoperative Plan: Postoperative administration of opioids is intended.    Pertinent diagnostic labs and testing reviewed    Informed Consent:    Anesthetic plan and risks discussed with patient.    Use of blood products discussed with: patient whom consented to blood products.

## 2023-07-26 ENCOUNTER — HOSPITAL ENCOUNTER (EMERGENCY)
Facility: MEDICAL CENTER | Age: 48
End: 2023-07-26
Payer: COMMERCIAL

## 2023-07-26 VITALS
SYSTOLIC BLOOD PRESSURE: 94 MMHG | TEMPERATURE: 98.5 F | OXYGEN SATURATION: 96 % | RESPIRATION RATE: 18 BRPM | HEART RATE: 98 BPM | DIASTOLIC BLOOD PRESSURE: 54 MMHG

## 2024-01-29 ENCOUNTER — HOSPITAL ENCOUNTER (EMERGENCY)
Facility: MEDICAL CENTER | Age: 49
End: 2024-01-30
Attending: EMERGENCY MEDICINE
Payer: COMMERCIAL

## 2024-01-29 ENCOUNTER — APPOINTMENT (OUTPATIENT)
Dept: RADIOLOGY | Facility: MEDICAL CENTER | Age: 49
End: 2024-01-29
Attending: EMERGENCY MEDICINE
Payer: COMMERCIAL

## 2024-01-29 DIAGNOSIS — N93.8 DYSFUNCTIONAL UTERINE BLEEDING: ICD-10-CM

## 2024-01-29 DIAGNOSIS — D62 ACUTE BLOOD LOSS ANEMIA: ICD-10-CM

## 2024-01-29 LAB
ALBUMIN SERPL BCP-MCNC: 4.1 G/DL (ref 3.2–4.9)
ALBUMIN/GLOB SERPL: 1.5 G/DL
ALP SERPL-CCNC: 65 U/L (ref 30–99)
ALT SERPL-CCNC: 15 U/L (ref 2–50)
ANION GAP SERPL CALC-SCNC: 10 MMOL/L (ref 7–16)
ANISOCYTOSIS BLD QL SMEAR: ABNORMAL
AST SERPL-CCNC: 20 U/L (ref 12–45)
BASOPHILS # BLD AUTO: 1.7 % (ref 0–1.8)
BASOPHILS # BLD: 0.16 K/UL (ref 0–0.12)
BILIRUB SERPL-MCNC: 0.2 MG/DL (ref 0.1–1.5)
BUN SERPL-MCNC: 17 MG/DL (ref 8–22)
CALCIUM ALBUM COR SERPL-MCNC: 8.5 MG/DL (ref 8.5–10.5)
CALCIUM SERPL-MCNC: 8.6 MG/DL (ref 8.5–10.5)
CHLORIDE SERPL-SCNC: 104 MMOL/L (ref 96–112)
CO2 SERPL-SCNC: 23 MMOL/L (ref 20–33)
CREAT SERPL-MCNC: 1.09 MG/DL (ref 0.5–1.4)
EOSINOPHIL # BLD AUTO: 0.09 K/UL (ref 0–0.51)
EOSINOPHIL NFR BLD: 0.9 % (ref 0–6.9)
ERYTHROCYTE [DISTWIDTH] IN BLOOD BY AUTOMATED COUNT: 43.8 FL (ref 35.9–50)
GFR SERPLBLD CREATININE-BSD FMLA CKD-EPI: 63 ML/MIN/1.73 M 2
GLOBULIN SER CALC-MCNC: 2.8 G/DL (ref 1.9–3.5)
GLUCOSE SERPL-MCNC: 112 MG/DL (ref 65–99)
HCG SERPL QL: NEGATIVE
HCT VFR BLD AUTO: 20.5 % (ref 37–47)
HGB BLD-MCNC: 5.6 G/DL (ref 12–16)
HYPOCHROMIA BLD QL SMEAR: ABNORMAL
LYMPHOCYTES # BLD AUTO: 1.43 K/UL (ref 1–4.8)
LYMPHOCYTES NFR BLD: 14.9 % (ref 22–41)
MACROCYTES BLD QL SMEAR: ABNORMAL
MANUAL DIFF BLD: NORMAL
MCH RBC QN AUTO: 17.4 PG (ref 27–33)
MCHC RBC AUTO-ENTMCNC: 27.3 G/DL (ref 32.2–35.5)
MCV RBC AUTO: 63.9 FL (ref 81.4–97.8)
MICROCYTES BLD QL SMEAR: ABNORMAL
MONOCYTES # BLD AUTO: 0.17 K/UL (ref 0–0.85)
MONOCYTES NFR BLD AUTO: 1.8 % (ref 0–13.4)
MORPHOLOGY BLD-IMP: NORMAL
NEUTROPHILS # BLD AUTO: 7.75 K/UL (ref 1.82–7.42)
NEUTROPHILS NFR BLD: 80.7 % (ref 44–72)
NRBC # BLD AUTO: 0.02 K/UL
NRBC BLD-RTO: 0.2 /100 WBC (ref 0–0.2)
OVALOCYTES BLD QL SMEAR: NORMAL
PLATELET # BLD AUTO: 313 K/UL (ref 164–446)
PLATELET BLD QL SMEAR: NORMAL
PMV BLD AUTO: 9.1 FL (ref 9–12.9)
POIKILOCYTOSIS BLD QL SMEAR: NORMAL
POTASSIUM SERPL-SCNC: 4 MMOL/L (ref 3.6–5.5)
PROT SERPL-MCNC: 6.9 G/DL (ref 6–8.2)
RBC # BLD AUTO: 3.21 M/UL (ref 4.2–5.4)
RBC BLD AUTO: PRESENT
SCHISTOCYTES BLD QL SMEAR: NORMAL
SODIUM SERPL-SCNC: 137 MMOL/L (ref 135–145)
WBC # BLD AUTO: 9.6 K/UL (ref 4.8–10.8)

## 2024-01-29 PROCEDURE — 80053 COMPREHEN METABOLIC PANEL: CPT

## 2024-01-29 PROCEDURE — 85027 COMPLETE CBC AUTOMATED: CPT

## 2024-01-29 PROCEDURE — 85730 THROMBOPLASTIN TIME PARTIAL: CPT

## 2024-01-29 PROCEDURE — 36415 COLL VENOUS BLD VENIPUNCTURE: CPT

## 2024-01-29 PROCEDURE — 86900 BLOOD TYPING SEROLOGIC ABO: CPT

## 2024-01-29 PROCEDURE — 99285 EMERGENCY DEPT VISIT HI MDM: CPT

## 2024-01-29 PROCEDURE — 86850 RBC ANTIBODY SCREEN: CPT

## 2024-01-29 PROCEDURE — 85007 BL SMEAR W/DIFF WBC COUNT: CPT

## 2024-01-29 PROCEDURE — 86901 BLOOD TYPING SEROLOGIC RH(D): CPT

## 2024-01-29 PROCEDURE — 85610 PROTHROMBIN TIME: CPT

## 2024-01-29 PROCEDURE — 84703 CHORIONIC GONADOTROPIN ASSAY: CPT

## 2024-01-29 ASSESSMENT — FIBROSIS 4 INDEX: FIB4 SCORE: 0.68

## 2024-01-29 ASSESSMENT — PAIN DESCRIPTION - PAIN TYPE: TYPE: ACUTE PAIN

## 2024-01-30 VITALS
DIASTOLIC BLOOD PRESSURE: 66 MMHG | SYSTOLIC BLOOD PRESSURE: 141 MMHG | WEIGHT: 223.77 LBS | TEMPERATURE: 97.3 F | RESPIRATION RATE: 19 BRPM | BODY MASS INDEX: 38.2 KG/M2 | HEIGHT: 64 IN | OXYGEN SATURATION: 97 % | HEART RATE: 81 BPM

## 2024-01-30 LAB
ABO GROUP BLD: NORMAL
APTT PPP: 26.7 SEC (ref 24.7–36)
BARCODED ABORH UBTYP: 9500
BARCODED PRD CODE UBPRD: NORMAL
BARCODED UNIT NUM UBUNT: NORMAL
BLD GP AB SCN SERPL QL: NORMAL
COMPONENT R 8504R: NORMAL
INR PPP: 1.1 (ref 0.87–1.13)
PRODUCT TYPE UPROD: NORMAL
PROTHROMBIN TIME: 14.3 SEC (ref 12–14.6)
RH BLD: NORMAL
UNIT STATUS USTAT: NORMAL

## 2024-01-30 PROCEDURE — A9270 NON-COVERED ITEM OR SERVICE: HCPCS | Performed by: EMERGENCY MEDICINE

## 2024-01-30 PROCEDURE — 36430 TRANSFUSION BLD/BLD COMPNT: CPT

## 2024-01-30 PROCEDURE — 86923 COMPATIBILITY TEST ELECTRIC: CPT

## 2024-01-30 PROCEDURE — 76830 TRANSVAGINAL US NON-OB: CPT

## 2024-01-30 PROCEDURE — 700102 HCHG RX REV CODE 250 W/ 637 OVERRIDE(OP): Performed by: EMERGENCY MEDICINE

## 2024-01-30 PROCEDURE — P9016 RBC LEUKOCYTES REDUCED: HCPCS

## 2024-01-30 RX ORDER — MEDROXYPROGESTERONE ACETATE 10 MG/1
20 TABLET ORAL ONCE
Status: COMPLETED | OUTPATIENT
Start: 2024-01-30 | End: 2024-01-30

## 2024-01-30 RX ORDER — MEDROXYPROGESTERONE ACETATE 10 MG/1
10 TABLET ORAL DAILY
Qty: 30 TABLET | Refills: 0 | Status: ON HOLD | OUTPATIENT
Start: 2024-01-30 | End: 2024-02-19

## 2024-01-30 RX ADMIN — MEDROXYPROGESTERONE ACETATE 20 MG: 10 TABLET ORAL at 02:04

## 2024-01-30 NOTE — ED NOTES
Unit of blood complete, no suspected reaction. ERP and RN discussion - pt to be held for an additional half an hour to verify no reaction. Pt resting with even chest rise and fall, reports no needs at this time, call light available and in reach.

## 2024-01-30 NOTE — ED NOTES
Bedside report received from off going RN Leta, assumed care of patient.  POC discussed with patient. Call light within reach, all needs addressed at this time.       Fall risk interventions in place: Keep floor surfaces clean and dry and Accompanied to restroom (all applicable per Rolfe Fall risk assessment)   Continuous monitoring: Cardiac Leads, Pulse Ox, or Blood Pressure  IVF/IV medications: Not Applicable   Oxygen: Room Air  Bedside sitter: Not Applicable   Isolation: Not Applicable

## 2024-01-30 NOTE — ED TRIAGE NOTES
".  Chief Complaint   Patient presents with    Other     Abnormal periods.    Vaginal Bleeding       48 yr patient walked in to triage for above complaint. Per patient she has been using 3 pads in a 24hr period      Pt placed in lobby. Pt educated on triage process. Pt encouraged to alert staff for any changes.     Patient and staff wearing appropriate PPE    /70   Pulse 94   Temp 36.5 °C (97.7 °F) (Temporal)   Resp 16   Ht 1.626 m (5' 4\")   Wt 101 kg (223 lb 12.3 oz)   SpO2 100%   BMI 38.41 kg/m²     "

## 2024-01-30 NOTE — ED NOTES
Bedside report to Ciera ESQUIVEL. Pt on cardiac monitor, pulse ox, and automatic BP. Fall precautions in place. Call light within reach. No supplemental O2 use at this time

## 2024-01-30 NOTE — ED NOTES
Damián from Lab called with critical result of hgb 5.6 at 2313. Critical lab result read back to Damián.   Dr. Amado notified of critical lab result at 2313.  Critical lab result read back by Dr. Amado.

## 2024-01-30 NOTE — ED NOTES
Continued blood infusion at this time, RN bedside for first 15 minutes of transfusion. No suspected reaction observed. Pt resting with even chest rise and fall, reports no needs at this time, call light available and in reach.

## 2024-01-30 NOTE — ED PROVIDER NOTES
ED Provider Note    CHIEF COMPLAINT  Chief Complaint   Patient presents with    Other     Abnormal periods.    Vaginal Bleeding       EXTERNAL RECORDS REVIEWED  Outpatient Notes outpatient surgery March of last year for ablation    HPI/ROS  LIMITATION TO HISTORY   Select: : None  OUTSIDE HISTORIAN(S):      Sruthi Jenkins is a 48 y.o. female who presents to the emergency department with chief complaint of weakness.  Patient has a history of dysfunctional uterine bleeding.  Patient had rollerball ablation in 2023 this is proximately 10 months prior.  She reports that this week she has had extremely heavy bleeding for multiple days she was soaking through 4-5 pads daily.  She states that the bleeding has actually somewhat slowed down today however she is feeling very lightheaded dizziness she gets very short of breath with any exertion.  No pain no fevers no chills no urinary or stooling complaints.     PAST MEDICAL HISTORY   has a past medical history of Anemia, Diabetes (HCC), Gynecological disorder, and Heart burn.    SURGICAL HISTORY   has a past surgical history that includes dilation and curettage (N/A, 3/31/2023).    FAMILY HISTORY  No family history on file.    SOCIAL HISTORY  Social History     Tobacco Use    Smoking status: Former     Current packs/day: 0.00     Average packs/day: 0.5 packs/day for 10.0 years (5.0 ttl pk-yrs)     Types: Cigarettes     Start date: 10/1/2012     Quit date: 10/1/2022     Years since quittin.3    Smokeless tobacco: Never   Vaping Use    Vaping Use: Never used   Substance and Sexual Activity    Alcohol use: Yes     Alcohol/week: 0.6 oz     Types: 1 Glasses of wine per week    Drug use: No    Sexual activity: Not on file       CURRENT MEDICATIONS  Home Medications       Reviewed by Neville Pan R.N. (Registered Nurse) on 24 at 2221  Med List Status: Not Addressed     Medication Last Dose Status   ibuprofen (MOTRIN) 800 MG Tab  Active               "      ALLERGIES  No Known Allergies    PHYSICAL EXAM  VITAL SIGNS: /70   Pulse 94   Temp 36.5 °C (97.7 °F) (Temporal)   Resp 16   Ht 1.626 m (5' 4\")   Wt 101 kg (223 lb 12.3 oz)   SpO2 100%   BMI 38.41 kg/m²    Pulse ox interpretation: I interpret this pulse ox as normal.  Constitutional: Alert and oriented x 3, minimal distress  HEENT: Atraumatic normocephalic, pupils are equal round reactive to light extraocular movements are intact.  Pale conjunctive the nares is clear, external ears are normal, mouth shows moist mucous membranes normal dentition for age, pale mucous membranes  Neck: Supple, no JVD no tracheal deviation  Cardiovascular: Regular rate and rhythm no murmur rub or gallop 2+ pulses peripherally x4  Thorax & Lungs: No respiratory distress, no wheezes rales or rhonchi, No chest tenderness.   GI: Soft nontender nondistended positive bowel sounds, no peritoneal signs  Skin: Pale  Musculoskeletal: Moving all extremities with full range and 5 of 5 strength no acute  deformity  Neurologic: Cranial nerves III through XII are grossly intact no sensory deficit no cerebellar dysfunction   Psychiatric: Appropriate affect for situation at this time          DIAGNOSTIC STUDIES / PROCEDURES  Results for orders placed or performed during the hospital encounter of 01/29/24   CBC WITH DIFFERENTIAL   Result Value Ref Range    WBC 9.6 4.8 - 10.8 K/uL    RBC 3.21 (L) 4.20 - 5.40 M/uL    Hemoglobin 5.6 (LL) 12.0 - 16.0 g/dL    Hematocrit 20.5 (L) 37.0 - 47.0 %    MCV 63.9 (L) 81.4 - 97.8 fL    MCH 17.4 (L) 27.0 - 33.0 pg    MCHC 27.3 (L) 32.2 - 35.5 g/dL    RDW 43.8 35.9 - 50.0 fL    Platelet Count 313 164 - 446 K/uL    MPV 9.1 9.0 - 12.9 fL    Neutrophils-Polys 80.70 (H) 44.00 - 72.00 %    Lymphocytes 14.90 (L) 22.00 - 41.00 %    Monocytes 1.80 0.00 - 13.40 %    Eosinophils 0.90 0.00 - 6.90 %    Basophils 1.70 0.00 - 1.80 %    Nucleated RBC 0.20 0.00 - 0.20 /100 WBC    Neutrophils (Absolute) 7.75 (H) 1.82 - " 7.42 K/uL    Lymphs (Absolute) 1.43 1.00 - 4.80 K/uL    Monos (Absolute) 0.17 0.00 - 0.85 K/uL    Eos (Absolute) 0.09 0.00 - 0.51 K/uL    Baso (Absolute) 0.16 (H) 0.00 - 0.12 K/uL    NRBC (Absolute) 0.02 K/uL    Hypochromia 2+ (A)     Anisocytosis 2+ (A)     Macrocytosis 1+     Microcytosis 2+ (A)    COMP METABOLIC PANEL   Result Value Ref Range    Sodium 137 135 - 145 mmol/L    Potassium 4.0 3.6 - 5.5 mmol/L    Chloride 104 96 - 112 mmol/L    Co2 23 20 - 33 mmol/L    Anion Gap 10.0 7.0 - 16.0    Glucose 112 (H) 65 - 99 mg/dL    Bun 17 8 - 22 mg/dL    Creatinine 1.09 0.50 - 1.40 mg/dL    Calcium 8.6 8.5 - 10.5 mg/dL    Correct Calcium 8.5 8.5 - 10.5 mg/dL    AST(SGOT) 20 12 - 45 U/L    ALT(SGPT) 15 2 - 50 U/L    Alkaline Phosphatase 65 30 - 99 U/L    Total Bilirubin 0.2 0.1 - 1.5 mg/dL    Albumin 4.1 3.2 - 4.9 g/dL    Total Protein 6.9 6.0 - 8.2 g/dL    Globulin 2.8 1.9 - 3.5 g/dL    A-G Ratio 1.5 g/dL   HCG QUAL SERUM   Result Value Ref Range    Beta-Hcg Qualitative Serum Negative Negative   ESTIMATED GFR   Result Value Ref Range    GFR (CKD-EPI) 63 >60 mL/min/1.73 m 2   PTT   Result Value Ref Range    APTT 26.7 24.7 - 36.0 sec   PT/INR   Result Value Ref Range    PT 14.3 12.0 - 14.6 sec    INR 1.10 0.87 - 1.13   COD - Adult (Type and Screen)   Result Value Ref Range    ABO Grouping Only O     Rh Grouping Only POS     Antibody Screen-Cod NEG     Component R       R66                 Red Blood Cells6    [id number]   issued       01/30/24   00:42      Product Type Red Blood Cells  LR Pheresis     Dispense Status issued     Unit Number (Barcoded) [id number]     Product Code (Barcoded) S5036Z48     Blood Type (Barcoded) 9500    DIFFERENTIAL MANUAL   Result Value Ref Range    Manual Diff Status PERFORMED    PERIPHERAL SMEAR REVIEW   Result Value Ref Range    Peripheral Smear Review see below    PLATELET ESTIMATE   Result Value Ref Range    Plt Estimation Normal    MORPHOLOGY   Result Value Ref Range    RBC  Morphology Present     Poikilocytosis 1+     Ovalocytes 1+     Schistocytes 1+         RADIOLOGY  I have independently interpreted the diagnostic imaging associated with this visit and am waiting the final reading from the radiologist.   My preliminary interpretation is as follows:     US-PELVIC COMPLETE (TRANSABDOMINAL/TRANSVAGINAL) (COMBO)   Final Result      1.  Doppler examination of the right ovary fails to demonstrate flow. The ovary does not appear enlarged. This could be artifactual due to the location of the ovary however torsion cannot be excluded.   2.  Slightly hyperechoic region measuring 1.1 x 0.7 x 0.5 cm which could represent focal acute hemorrhage, a uterine polyp or prominent endometrial tissue. Follow-up is recommended.   3.  3.8 cm uterine fibroid.      Findings were conveyed to Dr. ERON CAO on 1/30/2024 12:57 AM.              COURSE & MEDICAL DECISION MAKING    ED Observation Status? No; Patient does not meet criteria for ED Observation.     ASSESSMENT, COURSE AND PLAN  Care Narrative: Very pleasant 48-year-old female who has had heavy menstrual bleeding over the last week.  Feeling very lightheaded and short of breath today very weak.  Found to have a hemoglobin of 5.5.  Patient transfused 1 unit of PRBCs while in the emergency department.  Patient reports bleeding is vastly improved today transvaginal ultrasound shows hyperechoic region of 1.1 x 1.7 x 0.5 in the endometrial canal that is likely focal hemorrhage versus polyp.  I discussed the case up to this point with gynecologist .  We are both in agreement that it is appropriate to transfuse the patient at this time however with her normal vital signs and decreased vaginal bleeding that she does not likely require hospitalization.  We will transfuse 1 unit PRBCs we will give 20 mg of Provera here and initiate patient on 10 mg of Provera daily.  Dr. Ivey will arrange to see the patient in her clinic tomorrow  "afternoon.    I have discussed this plan up to this point with the patient she is agreeable to this plan appreciative of treatment.  Patient's had several transfusions in the past and never had reaction.  I think the chance of delayed transfusion reaction highly unlikely in this patient she was observed for approximately 2 hours from time of initiation of the blood transfusion she has had no shortness of breath no chest pain no fevers chills nausea vomiting no other acute symptom changes or concerns and she reports vast improvement of her previous symptoms with the blood transfusion.  Patient understands to return should she develop any chest pain palpitation shortness of breath skin changes fevers chills nausea vomiting any other acute symptom change or concern she is otherwise discharged in stable and improved condition.          ADDITIONAL PROBLEM LIST    DISPOSITION AND DISCUSSIONS  I have discussed management of the patient with the following physicians and ALEXANDRE's:  OSIEL Ivey gynecology    Discussion of management with other QHP or appropriate source(s):      Escalation of care considered, and ultimately not performed:acute inpatient care management, however at this time, the patient is most appropriate for outpatient management    Barriers to care at this time, including but not limited to: .     Decision tools and prescription drugs considered including, but not limited to: Provera for dysfunctional uterine bleeding.  BP (!) 141/74   Pulse 82   Temp 36.3 °C (97.3 °F) (Temporal)   Resp 17   Ht 1.626 m (5' 4\")   Wt 101 kg (223 lb 12.3 oz)   SpO2 97%   BMI 38.41 kg/m²     Taina Ivey M.D.  [address] [place] [id number] [phone]          [facility], Emergency Dept  [address] [facility] [address] [phone]    in 12-24 hours if symptoms persist, immediately If symptoms worsen, or if you develop any other symptoms or concerns      FINAL DIAGNOSIS  1. " Dysfunctional uterine bleeding Active   2. Acute blood loss anemia Active   3.  Transfusion of PRBCs       Electronically signed by: Tom Amado M.D.

## 2024-01-30 NOTE — ED NOTES
Patient given discharge instructions and verbalizes understanding. Patient changing into clothing, provided pads and underwear. Patient instructed to call RN if any dizziness is felt.

## 2024-02-01 ENCOUNTER — HOSPITAL ENCOUNTER (OUTPATIENT)
Dept: LAB | Facility: MEDICAL CENTER | Age: 49
End: 2024-02-01
Attending: OBSTETRICS & GYNECOLOGY
Payer: COMMERCIAL

## 2024-02-01 LAB
FSH SERPL-ACNC: 4.4 MIU/ML
LH SERPL-ACNC: 5.5 IU/L
PROLACTIN SERPL-MCNC: 16.2 NG/ML (ref 2.8–26)
T4 FREE SERPL-MCNC: 1.21 NG/DL (ref 0.93–1.7)
TSH SERPL DL<=0.005 MIU/L-ACNC: 2.42 UIU/ML (ref 0.38–5.33)

## 2024-02-01 PROCEDURE — 83001 ASSAY OF GONADOTROPIN (FSH): CPT

## 2024-02-01 PROCEDURE — 36415 COLL VENOUS BLD VENIPUNCTURE: CPT

## 2024-02-01 PROCEDURE — 84443 ASSAY THYROID STIM HORMONE: CPT

## 2024-02-01 PROCEDURE — 84146 ASSAY OF PROLACTIN: CPT

## 2024-02-01 PROCEDURE — 84439 ASSAY OF FREE THYROXINE: CPT

## 2024-02-01 PROCEDURE — 83002 ASSAY OF GONADOTROPIN (LH): CPT

## 2024-02-15 ENCOUNTER — APPOINTMENT (OUTPATIENT)
Dept: ADMISSIONS | Facility: MEDICAL CENTER | Age: 49
End: 2024-02-15
Attending: OBSTETRICS & GYNECOLOGY
Payer: COMMERCIAL

## 2024-02-15 NOTE — OR NURSING
Preadmit: RN attempted to call patient to do RN PAT for upcoming surgery on 2/19/24. No answer, voicemail left to return call to [phone].

## 2024-02-16 ENCOUNTER — PRE-ADMISSION TESTING (OUTPATIENT)
Dept: ADMISSIONS | Facility: MEDICAL CENTER | Age: 49
End: 2024-02-16
Attending: OBSTETRICS & GYNECOLOGY
Payer: COMMERCIAL

## 2024-02-16 DIAGNOSIS — Z01.812 PRE-OPERATIVE LABORATORY EXAMINATION: ICD-10-CM

## 2024-02-16 LAB
ANION GAP SERPL CALC-SCNC: 10 MMOL/L (ref 7–16)
BUN SERPL-MCNC: 10 MG/DL (ref 8–22)
CALCIUM SERPL-MCNC: 8.5 MG/DL (ref 8.5–10.5)
CHLORIDE SERPL-SCNC: 107 MMOL/L (ref 96–112)
CO2 SERPL-SCNC: 23 MMOL/L (ref 20–33)
CREAT SERPL-MCNC: 0.66 MG/DL (ref 0.5–1.4)
ERYTHROCYTE [DISTWIDTH] IN BLOOD BY AUTOMATED COUNT: 51.6 FL (ref 35.9–50)
GFR SERPLBLD CREATININE-BSD FMLA CKD-EPI: 109 ML/MIN/1.73 M 2
GLUCOSE SERPL-MCNC: 111 MG/DL (ref 65–99)
HCT VFR BLD AUTO: 26.1 % (ref 37–47)
HGB BLD-MCNC: 6.7 G/DL (ref 12–16)
MCH RBC QN AUTO: 16.9 PG (ref 27–33)
MCHC RBC AUTO-ENTMCNC: 25.7 G/DL (ref 32.2–35.5)
MCV RBC AUTO: 65.7 FL (ref 81.4–97.8)
PLATELET # BLD AUTO: 332 K/UL (ref 164–446)
PMV BLD AUTO: 10.4 FL (ref 9–12.9)
POTASSIUM SERPL-SCNC: 3.8 MMOL/L (ref 3.6–5.5)
RBC # BLD AUTO: 3.97 M/UL (ref 4.2–5.4)
SODIUM SERPL-SCNC: 140 MMOL/L (ref 135–145)
WBC # BLD AUTO: 5.2 K/UL (ref 4.8–10.8)

## 2024-02-16 PROCEDURE — 80048 BASIC METABOLIC PNL TOTAL CA: CPT

## 2024-02-16 PROCEDURE — 36415 COLL VENOUS BLD VENIPUNCTURE: CPT

## 2024-02-16 PROCEDURE — 85027 COMPLETE CBC AUTOMATED: CPT

## 2024-02-19 ENCOUNTER — ANESTHESIA (OUTPATIENT)
Dept: SURGERY | Facility: MEDICAL CENTER | Age: 49
End: 2024-02-19
Payer: COMMERCIAL

## 2024-02-19 ENCOUNTER — PHARMACY VISIT (OUTPATIENT)
Dept: PHARMACY | Facility: MEDICAL CENTER | Age: 49
End: 2024-02-19
Payer: COMMERCIAL

## 2024-02-19 ENCOUNTER — ANESTHESIA EVENT (OUTPATIENT)
Dept: SURGERY | Facility: MEDICAL CENTER | Age: 49
End: 2024-02-19
Payer: COMMERCIAL

## 2024-02-19 ENCOUNTER — HOSPITAL ENCOUNTER (OUTPATIENT)
Facility: MEDICAL CENTER | Age: 49
End: 2024-02-19
Attending: OBSTETRICS & GYNECOLOGY | Admitting: OBSTETRICS & GYNECOLOGY
Payer: COMMERCIAL

## 2024-02-19 VITALS
BODY MASS INDEX: 37.75 KG/M2 | OXYGEN SATURATION: 96 % | HEART RATE: 67 BPM | HEIGHT: 64 IN | WEIGHT: 221.12 LBS | RESPIRATION RATE: 16 BRPM | TEMPERATURE: 97 F | DIASTOLIC BLOOD PRESSURE: 67 MMHG | SYSTOLIC BLOOD PRESSURE: 115 MMHG

## 2024-02-19 DIAGNOSIS — G89.18 POSTOPERATIVE PAIN: ICD-10-CM

## 2024-02-19 LAB
ABO GROUP BLD: ABNORMAL
BARCODED ABORH UBTYP: 5100
BARCODED ABORH UBTYP: 5100
BARCODED PRD CODE UBPRD: ABNORMAL
BARCODED PRD CODE UBPRD: ABNORMAL
BARCODED UNIT NUM UBUNT: ABNORMAL
BARCODED UNIT NUM UBUNT: ABNORMAL
BLD GP AB INVEST PLASRBC-IMP: ABNORMAL
BLD GP AB INVEST PLASRBC-IMP: ABNORMAL
BLD GP AB SCN SERPL QL: ABNORMAL
COMPONENT R 8504R: ABNORMAL
COMPONENT R 8504R: ABNORMAL
GLUCOSE BLD STRIP.AUTO-MCNC: 139 MG/DL (ref 65–99)
HCG UR QL: NEGATIVE
HCT VFR BLD AUTO: 32.7 % (ref 37–47)
HGB BLD-MCNC: 9.1 G/DL (ref 12–16)
PATHOLOGY CONSULT NOTE: NORMAL
PRODUCT TYPE UPROD: ABNORMAL
PRODUCT TYPE UPROD: ABNORMAL
RH BLD: ABNORMAL
UNIT STATUS USTAT: ABNORMAL
UNIT STATUS USTAT: ABNORMAL

## 2024-02-19 PROCEDURE — 160046 HCHG PACU - 1ST 60 MINS PHASE II: Performed by: OBSTETRICS & GYNECOLOGY

## 2024-02-19 PROCEDURE — 700111 HCHG RX REV CODE 636 W/ 250 OVERRIDE (IP): Performed by: ANESTHESIOLOGY

## 2024-02-19 PROCEDURE — 160031 HCHG SURGERY MINUTES - 1ST 30 MINS LEVEL 5: Performed by: OBSTETRICS & GYNECOLOGY

## 2024-02-19 PROCEDURE — P9016 RBC LEUKOCYTES REDUCED: HCPCS

## 2024-02-19 PROCEDURE — 85014 HEMATOCRIT: CPT

## 2024-02-19 PROCEDURE — 700102 HCHG RX REV CODE 250 W/ 637 OVERRIDE(OP): Performed by: ANESTHESIOLOGY

## 2024-02-19 PROCEDURE — 160009 HCHG ANES TIME/MIN: Performed by: OBSTETRICS & GYNECOLOGY

## 2024-02-19 PROCEDURE — 88309 TISSUE EXAM BY PATHOLOGIST: CPT

## 2024-02-19 PROCEDURE — A9270 NON-COVERED ITEM OR SERVICE: HCPCS | Performed by: ANESTHESIOLOGY

## 2024-02-19 PROCEDURE — 700105 HCHG RX REV CODE 258: Performed by: OBSTETRICS & GYNECOLOGY

## 2024-02-19 PROCEDURE — 160042 HCHG SURGERY MINUTES - EA ADDL 1 MIN LEVEL 5: Performed by: OBSTETRICS & GYNECOLOGY

## 2024-02-19 PROCEDURE — 160035 HCHG PACU - 1ST 60 MINS PHASE I: Performed by: OBSTETRICS & GYNECOLOGY

## 2024-02-19 PROCEDURE — 85018 HEMOGLOBIN: CPT

## 2024-02-19 PROCEDURE — 81025 URINE PREGNANCY TEST: CPT

## 2024-02-19 PROCEDURE — RXMED WILLOW AMBULATORY MEDICATION CHARGE: Performed by: OBSTETRICS & GYNECOLOGY

## 2024-02-19 PROCEDURE — 86870 RBC ANTIBODY IDENTIFICATION: CPT

## 2024-02-19 PROCEDURE — 86901 BLOOD TYPING SEROLOGIC RH(D): CPT

## 2024-02-19 PROCEDURE — 88342 IMHCHEM/IMCYTCHM 1ST ANTB: CPT

## 2024-02-19 PROCEDURE — 86902 BLOOD TYPE ANTIGEN DONOR EA: CPT | Mod: 91

## 2024-02-19 PROCEDURE — 88341 IMHCHEM/IMCYTCHM EA ADD ANTB: CPT | Mod: 91

## 2024-02-19 PROCEDURE — 36430 TRANSFUSION BLD/BLD COMPNT: CPT

## 2024-02-19 PROCEDURE — 160048 HCHG OR STATISTICAL LEVEL 1-5: Performed by: OBSTETRICS & GYNECOLOGY

## 2024-02-19 PROCEDURE — 86850 RBC ANTIBODY SCREEN: CPT

## 2024-02-19 PROCEDURE — 160025 RECOVERY II MINUTES (STATS): Performed by: OBSTETRICS & GYNECOLOGY

## 2024-02-19 PROCEDURE — 88360 TUMOR IMMUNOHISTOCHEM/MANUAL: CPT | Mod: 91

## 2024-02-19 PROCEDURE — 700101 HCHG RX REV CODE 250: Performed by: ANESTHESIOLOGY

## 2024-02-19 PROCEDURE — 700104 HCHG RX REV CODE 254: Performed by: ANESTHESIOLOGY

## 2024-02-19 PROCEDURE — 86922 COMPATIBILITY TEST ANTIGLOB: CPT

## 2024-02-19 PROCEDURE — 86900 BLOOD TYPING SEROLOGIC ABO: CPT

## 2024-02-19 PROCEDURE — 36415 COLL VENOUS BLD VENIPUNCTURE: CPT

## 2024-02-19 PROCEDURE — 160002 HCHG RECOVERY MINUTES (STAT): Performed by: OBSTETRICS & GYNECOLOGY

## 2024-02-19 PROCEDURE — 700101 HCHG RX REV CODE 250: Performed by: OBSTETRICS & GYNECOLOGY

## 2024-02-19 PROCEDURE — 82962 GLUCOSE BLOOD TEST: CPT

## 2024-02-19 PROCEDURE — 502714 HCHG ROBOTIC SURGERY SERVICES: Performed by: OBSTETRICS & GYNECOLOGY

## 2024-02-19 RX ORDER — METOPROLOL TARTRATE 1 MG/ML
1 INJECTION, SOLUTION INTRAVENOUS
Status: DISCONTINUED | OUTPATIENT
Start: 2024-02-19 | End: 2024-02-19 | Stop reason: HOSPADM

## 2024-02-19 RX ORDER — FERROUS SULFATE 324(65)MG
324 TABLET, DELAYED RELEASE (ENTERIC COATED) ORAL
COMMUNITY

## 2024-02-19 RX ORDER — MIDAZOLAM HYDROCHLORIDE 1 MG/ML
INJECTION INTRAMUSCULAR; INTRAVENOUS PRN
Status: DISCONTINUED | OUTPATIENT
Start: 2024-02-19 | End: 2024-02-19 | Stop reason: SURG

## 2024-02-19 RX ORDER — ROCURONIUM BROMIDE 10 MG/ML
INJECTION, SOLUTION INTRAVENOUS PRN
Status: DISCONTINUED | OUTPATIENT
Start: 2024-02-19 | End: 2024-02-19 | Stop reason: SURG

## 2024-02-19 RX ORDER — HYDRALAZINE HYDROCHLORIDE 20 MG/ML
5 INJECTION INTRAMUSCULAR; INTRAVENOUS
Status: DISCONTINUED | OUTPATIENT
Start: 2024-02-19 | End: 2024-02-19 | Stop reason: HOSPADM

## 2024-02-19 RX ORDER — OXYCODONE HYDROCHLORIDE AND ACETAMINOPHEN 5; 325 MG/1; MG/1
1 TABLET ORAL EVERY 4 HOURS PRN
Qty: 30 TABLET | Refills: 0 | Status: SHIPPED | OUTPATIENT
Start: 2024-02-19 | End: 2024-02-26

## 2024-02-19 RX ORDER — DIPHENHYDRAMINE HYDROCHLORIDE 50 MG/ML
12.5 INJECTION INTRAMUSCULAR; INTRAVENOUS
Status: DISCONTINUED | OUTPATIENT
Start: 2024-02-19 | End: 2024-02-19 | Stop reason: HOSPADM

## 2024-02-19 RX ORDER — EPHEDRINE SULFATE 50 MG/ML
5 INJECTION, SOLUTION INTRAVENOUS
Status: DISCONTINUED | OUTPATIENT
Start: 2024-02-19 | End: 2024-02-19 | Stop reason: HOSPADM

## 2024-02-19 RX ORDER — CEFAZOLIN SODIUM 1 G/3ML
INJECTION, POWDER, FOR SOLUTION INTRAMUSCULAR; INTRAVENOUS PRN
Status: DISCONTINUED | OUTPATIENT
Start: 2024-02-19 | End: 2024-02-19 | Stop reason: SURG

## 2024-02-19 RX ORDER — HALOPERIDOL 5 MG/ML
1 INJECTION INTRAMUSCULAR
Status: DISCONTINUED | OUTPATIENT
Start: 2024-02-19 | End: 2024-02-19 | Stop reason: HOSPADM

## 2024-02-19 RX ORDER — DEXAMETHASONE SODIUM PHOSPHATE 4 MG/ML
INJECTION, SOLUTION INTRA-ARTICULAR; INTRALESIONAL; INTRAMUSCULAR; INTRAVENOUS; SOFT TISSUE PRN
Status: DISCONTINUED | OUTPATIENT
Start: 2024-02-19 | End: 2024-02-19 | Stop reason: SURG

## 2024-02-19 RX ORDER — OXYCODONE HCL 5 MG/5 ML
10 SOLUTION, ORAL ORAL
Status: COMPLETED | OUTPATIENT
Start: 2024-02-19 | End: 2024-02-19

## 2024-02-19 RX ORDER — LIDOCAINE HYDROCHLORIDE 20 MG/ML
INJECTION, SOLUTION EPIDURAL; INFILTRATION; INTRACAUDAL; PERINEURAL PRN
Status: DISCONTINUED | OUTPATIENT
Start: 2024-02-19 | End: 2024-02-19 | Stop reason: SURG

## 2024-02-19 RX ORDER — MIDAZOLAM HYDROCHLORIDE 1 MG/ML
1 INJECTION INTRAMUSCULAR; INTRAVENOUS
Status: DISCONTINUED | OUTPATIENT
Start: 2024-02-19 | End: 2024-02-19 | Stop reason: HOSPADM

## 2024-02-19 RX ORDER — MEPERIDINE HYDROCHLORIDE 25 MG/ML
12.5 INJECTION INTRAMUSCULAR; INTRAVENOUS; SUBCUTANEOUS
Status: DISCONTINUED | OUTPATIENT
Start: 2024-02-19 | End: 2024-02-19 | Stop reason: HOSPADM

## 2024-02-19 RX ORDER — HYDROMORPHONE HYDROCHLORIDE 1 MG/ML
0.2 INJECTION, SOLUTION INTRAMUSCULAR; INTRAVENOUS; SUBCUTANEOUS
Status: DISCONTINUED | OUTPATIENT
Start: 2024-02-19 | End: 2024-02-19 | Stop reason: HOSPADM

## 2024-02-19 RX ORDER — OXYCODONE HCL 5 MG/5 ML
5 SOLUTION, ORAL ORAL
Status: COMPLETED | OUTPATIENT
Start: 2024-02-19 | End: 2024-02-19

## 2024-02-19 RX ORDER — LABETALOL HYDROCHLORIDE 5 MG/ML
5 INJECTION, SOLUTION INTRAVENOUS
Status: DISCONTINUED | OUTPATIENT
Start: 2024-02-19 | End: 2024-02-19 | Stop reason: HOSPADM

## 2024-02-19 RX ORDER — IBUPROFEN 800 MG/1
800 TABLET ORAL EVERY 8 HOURS PRN
Qty: 30 TABLET | Refills: 1 | Status: SHIPPED | OUTPATIENT
Start: 2024-02-19 | End: 2024-03-01

## 2024-02-19 RX ORDER — IPRATROPIUM BROMIDE AND ALBUTEROL SULFATE 2.5; .5 MG/3ML; MG/3ML
3 SOLUTION RESPIRATORY (INHALATION)
Status: DISCONTINUED | OUTPATIENT
Start: 2024-02-19 | End: 2024-02-19 | Stop reason: HOSPADM

## 2024-02-19 RX ORDER — IBUPROFEN 200 MG
200 TABLET ORAL
Status: ON HOLD | COMMUNITY
End: 2024-02-19

## 2024-02-19 RX ORDER — SODIUM CHLORIDE, SODIUM LACTATE, POTASSIUM CHLORIDE, CALCIUM CHLORIDE 600; 310; 30; 20 MG/100ML; MG/100ML; MG/100ML; MG/100ML
INJECTION, SOLUTION INTRAVENOUS CONTINUOUS
Status: ACTIVE | OUTPATIENT
Start: 2024-02-19 | End: 2024-02-19

## 2024-02-19 RX ORDER — DOCUSATE SODIUM 100 MG/1
100 CAPSULE, LIQUID FILLED ORAL 2 TIMES DAILY
Qty: 60 CAPSULE | Refills: 1 | Status: SHIPPED | OUTPATIENT
Start: 2024-02-19

## 2024-02-19 RX ORDER — BUPIVACAINE HYDROCHLORIDE AND EPINEPHRINE 2.5; 5 MG/ML; UG/ML
INJECTION, SOLUTION EPIDURAL; INFILTRATION; INTRACAUDAL; PERINEURAL
Status: DISCONTINUED | OUTPATIENT
Start: 2024-02-19 | End: 2024-02-19 | Stop reason: HOSPADM

## 2024-02-19 RX ORDER — ONDANSETRON 2 MG/ML
4 INJECTION INTRAMUSCULAR; INTRAVENOUS
Status: COMPLETED | OUTPATIENT
Start: 2024-02-19 | End: 2024-02-19

## 2024-02-19 RX ORDER — HYDROMORPHONE HYDROCHLORIDE 1 MG/ML
0.4 INJECTION, SOLUTION INTRAMUSCULAR; INTRAVENOUS; SUBCUTANEOUS
Status: DISCONTINUED | OUTPATIENT
Start: 2024-02-19 | End: 2024-02-19 | Stop reason: HOSPADM

## 2024-02-19 RX ORDER — ONDANSETRON 2 MG/ML
INJECTION INTRAMUSCULAR; INTRAVENOUS PRN
Status: DISCONTINUED | OUTPATIENT
Start: 2024-02-19 | End: 2024-02-19 | Stop reason: SURG

## 2024-02-19 RX ORDER — HYDROMORPHONE HYDROCHLORIDE 1 MG/ML
0.1 INJECTION, SOLUTION INTRAMUSCULAR; INTRAVENOUS; SUBCUTANEOUS
Status: DISCONTINUED | OUTPATIENT
Start: 2024-02-19 | End: 2024-02-19 | Stop reason: HOSPADM

## 2024-02-19 RX ADMIN — FENTANYL CITRATE 50 MCG: 50 INJECTION, SOLUTION INTRAMUSCULAR; INTRAVENOUS at 12:25

## 2024-02-19 RX ADMIN — SODIUM CHLORIDE, POTASSIUM CHLORIDE, SODIUM LACTATE AND CALCIUM CHLORIDE: 600; 310; 30; 20 INJECTION, SOLUTION INTRAVENOUS at 09:41

## 2024-02-19 RX ADMIN — MIDAZOLAM HYDROCHLORIDE 2 MG: 1 INJECTION, SOLUTION INTRAMUSCULAR; INTRAVENOUS at 09:43

## 2024-02-19 RX ADMIN — HYDROMORPHONE HYDROCHLORIDE 0.2 MG: 1 INJECTION, SOLUTION INTRAMUSCULAR; INTRAVENOUS; SUBCUTANEOUS at 16:15

## 2024-02-19 RX ADMIN — FENTANYL CITRATE 100 MCG: 50 INJECTION, SOLUTION INTRAMUSCULAR; INTRAVENOUS at 09:45

## 2024-02-19 RX ADMIN — FENTANYL CITRATE 50 MCG: 50 INJECTION, SOLUTION INTRAMUSCULAR; INTRAVENOUS at 11:35

## 2024-02-19 RX ADMIN — LIDOCAINE HYDROCHLORIDE 100 MG: 20 INJECTION, SOLUTION EPIDURAL; INFILTRATION; INTRACAUDAL at 09:48

## 2024-02-19 RX ADMIN — HYDROMORPHONE HYDROCHLORIDE 0.4 MG: 1 INJECTION, SOLUTION INTRAMUSCULAR; INTRAVENOUS; SUBCUTANEOUS at 14:53

## 2024-02-19 RX ADMIN — HYDROMORPHONE HYDROCHLORIDE 0.2 MG: 1 INJECTION, SOLUTION INTRAMUSCULAR; INTRAVENOUS; SUBCUTANEOUS at 17:04

## 2024-02-19 RX ADMIN — ROCURONIUM BROMIDE 50 MG: 50 INJECTION, SOLUTION INTRAVENOUS at 09:48

## 2024-02-19 RX ADMIN — ONDANSETRON 4 MG: 2 INJECTION INTRAMUSCULAR; INTRAVENOUS at 12:00

## 2024-02-19 RX ADMIN — ROCURONIUM BROMIDE 20 MG: 50 INJECTION, SOLUTION INTRAVENOUS at 10:47

## 2024-02-19 RX ADMIN — FENTANYL CITRATE 50 MCG: 50 INJECTION, SOLUTION INTRAMUSCULAR; INTRAVENOUS at 14:06

## 2024-02-19 RX ADMIN — CEFAZOLIN 2 G: 1 INJECTION, POWDER, FOR SOLUTION INTRAMUSCULAR; INTRAVENOUS at 09:59

## 2024-02-19 RX ADMIN — OXYCODONE HYDROCHLORIDE 10 MG: 5 SOLUTION ORAL at 12:24

## 2024-02-19 RX ADMIN — PROPOFOL 160 MG: 10 INJECTION, EMULSION INTRAVENOUS at 09:48

## 2024-02-19 RX ADMIN — HYDROMORPHONE HYDROCHLORIDE 0.2 MG: 1 INJECTION, SOLUTION INTRAMUSCULAR; INTRAVENOUS; SUBCUTANEOUS at 15:00

## 2024-02-19 RX ADMIN — DEXAMETHASONE SODIUM PHOSPHATE 8 MG: 4 INJECTION INTRA-ARTICULAR; INTRALESIONAL; INTRAMUSCULAR; INTRAVENOUS; SOFT TISSUE at 10:00

## 2024-02-19 RX ADMIN — SODIUM CHLORIDE, POTASSIUM CHLORIDE, SODIUM LACTATE AND CALCIUM CHLORIDE: 600; 310; 30; 20 INJECTION, SOLUTION INTRAVENOUS at 07:47

## 2024-02-19 RX ADMIN — ONDANSETRON 4 MG: 2 INJECTION INTRAMUSCULAR; INTRAVENOUS at 12:30

## 2024-02-19 RX ADMIN — HYDROMORPHONE HYDROCHLORIDE 0.2 MG: 1 INJECTION, SOLUTION INTRAMUSCULAR; INTRAVENOUS; SUBCUTANEOUS at 15:45

## 2024-02-19 RX ADMIN — SUGAMMADEX 200 MG: 100 INJECTION, SOLUTION INTRAVENOUS at 12:07

## 2024-02-19 RX ADMIN — INDIGOTINDISULFONATE SODIUM 40 MG: 8 INJECTION INTRAVENOUS at 11:42

## 2024-02-19 ASSESSMENT — PAIN DESCRIPTION - PAIN TYPE
TYPE: SURGICAL PAIN
TYPE: SURGICAL PAIN
TYPE: SURGICAL PAIN;NEUROPATHIC PAIN

## 2024-02-19 ASSESSMENT — FIBROSIS 4 INDEX: FIB4 SCORE: 0.72

## 2024-02-19 ASSESSMENT — PAIN SCALES - GENERAL: PAIN_LEVEL: 2

## 2024-02-19 NOTE — OR NURSING
1214: Pt arrived from OR, handoff received from anesthesiologist and RN. Patient asleep, breathing even and unlabored. Vitals stable. Four lap site to abdomen with derma bond, C/D/I. Per anesthesia patient to receive 2 units of RBCs in place. Consent verified, in chart.     1216: Blood released, blood bank called.     1225: Patient awake, oriented x4, moving all extremities. Medicated for pian per MAR.Doctor Ivey at bedside discussing procedure with patient, will obtain repeat H&H post blood transfusion in pacu.     1230: Blood bank called to check on blood status, should arrive to pacu in the next couple of minutes. Patient medicated for nausea post oral medication administration.     1235: Patient states nausea improved. First unit of blood administration began.    1300: Patient stable, no apparent adverse reaction to transfusion.     1340: Blood bank called for second unit of blood.     1353: Patient sleeping intermittently, tolerating oral intake of fluids. Second unit of blood administration began.     1415: Daughter updated on status and POC.    1406: Continuing to medicate for pain.     1520: Blood transfusion completed. Patient remains stable. Medicating for pain per MAR. Daughter updated on status.     1600: Small amount of serosanguineous fluids noted to far right incision, tegaderm remains intact. Prescription for home picked up from [facility] pharmacy.     1620: Phlebotomist at bedside for H and H draw.     1630: Incision sites stable, no new drainage noted.     1648: Daughter updated.     1700: Patient ambulated to restroom, tolerated well. Able to void/empty bladder.     1715: Report given to phase 2 RNRocio. No changes to incisions sites.

## 2024-02-19 NOTE — PROGRESS NOTES
Medication history reviewed with PT at bedside    Med rec is complete per PT reporting    Allergies reviewed.     Patient denies any outpatient antibiotics in the last 30 days.     Patient is not taking anticoagulants.    Preferred pharmacy for this visit - [facility] on [address] ([phone])

## 2024-02-19 NOTE — ANESTHESIA PREPROCEDURE EVALUATION
Case: [id number] Date/Time: 02/19/24 0815    Procedure: ROBOTIC ASSISTED TOTAL LAPAROSCOPIC HYSTERECTOMY WITH BILATERAL SALPINGECTOMY, CYSTOSCOPY    Pre-op diagnosis: ABNORMAL UTERINE BLEEDING, ANEMIA, COMPLEX HYPERPLASIA    Location: [facility] OR 17 / SURGERY [facility]    Surgeons: Taina Ivey M.D.            Relevant Problems   No relevant active problems       Physical Exam    Airway   Mallampati: II  TM distance: >3 FB  Neck ROM: full       Cardiovascular - normal exam  Rhythm: regular  Rate: normal  (-) murmur     Dental - normal exam           Pulmonary - normal exam  Breath sounds clear to auscultation     Abdominal    Neurological - normal exam         Other findings: Patient is anemic with HGB of 6.4.              Anesthesia Plan    ASA 2       Plan - general       Airway plan will be ETT    (Patient will need a large IV and the blood warmer will be set up.)      Induction: intravenous    Postoperative Plan: Postoperative administration of opioids is intended.    Pertinent diagnostic labs and testing reviewed    Informed Consent:    Anesthetic plan and risks discussed with patient.    Use of blood products discussed with: patient whom consented to blood products.

## 2024-02-19 NOTE — ANESTHESIA TIME REPORT
Anesthesia Start and Stop Event Times       Date Time Event    2/19/2024 0832 Ready for Procedure     0941 Anesthesia Start     1220 Anesthesia Stop          Responsible Staff  02/19/24      Name Role Begin End    Glynn Garzon M.D. Anesth 0941 1220          Overtime Reason:  no overtime (within assigned shift)    Comments:

## 2024-02-19 NOTE — OP REPORT
DATE OF OPERATION: 2/19/24    PreOp Diagnosis:   1. AUB  2. Anemia from blood loss  3. Enlarged Uterus    PostOp Diagnosis:   1. AUB  2. Anemia from blood loss  3. Enlarged Uterus    Procedure(s) (LRB):  HYSTERECTOMY ROBOTIC TOTAL WITH EB SALPINGO-OOPHORECTOMY (Bilateral)  Postop Transfusion  CYSTOSCOPY    Surgeon(s): Taina Ivey M.D.  Assistant: Taina Rivero MD    Anesthesiologist/Type of Anesthesia: KIMI Dunn MD, MTREVIN./General    Specimen: UTERUS, TUBES OVARIES AND CERVIX    Estimated Blood Loss: 25 CC    Findings: ENLARGED UTERUS SOUNDED TO 13 CM, NORMAL OVARIES AND TUBES. GREATER   THAN 500 G.    CYSTOSCOPY SHOWED NO INJURY, FOREIGN MATERIAL OR DISEASE PROCESS IN THE BLADDER.   URETERAL JETS SEEN BILATERALLY.    ANEMIA FROM CHRONIC BLOOD LOSS WITH HB PRIOR TO PROCEDURE OF 6. WILL TRANSFUSE 2 UNITS    Complications: NONE     PROCEDURE IN DETAIL: After informed consent was obtained, the patient was   taken to the operating room where general endotracheal anesthesia was   administered without difficulty. She was then prepped and draped in the usual   sterile fashion in the lithotomy position with Jacky stirrups. A formal time   out was called prior to the procedure and the preoperative antibiotics were   given. Examination under anesthesia was performed and a Cross catheter was   placed under sterile technique. V-Care uterine manipulator was placed without   any difficulty and the uterus was sounded to 13 cm. The procedure went   towards the abdomen at this time. At approximately 4 cm above the umbilicus,   a 8 mm skin incision was performed after local Marcaine with epinephrine was   infiltrated. The Veress needle was placed into the peritoneal cavity at a   45-degree angle while tenting the abdominal wall. Intraperitoneal placement   was confirmed via water syringe test. Initial opening pressure was 6 mmHg.   The abdomen was insufflated with CO2 gas at a pressure of 15 mmHg and   pneumoperitoneum was maintained at  approximately 3 liters of CO2 gas. A 8 mm   trocar was then inserted and the laparoscope was then placed to confirm   placement and also to note that there was no injury to the underlying tissue.   A survey of the pelvis and abdomen was then performed as noted above.   Lidocaine was then injected into left lower quadrant approximately 2 cm medial   to the anterior ischial spine and a 8 mm skin incision was made with a knife.   The 8 mm trocar was then advanced under direct visualization with no injury   to the underlying tissue. The two 8 mm the da Damari ports were then placed   approximately 10cm right and left lateral to the 8 mm camera port at a zero  degree angle downward under direct visualization, again there was no injury to   the underlying tissues. The robot was then docked and the procedure   was started. Starting with the right side, the IP ligament was grasped,   cauterized and cut. Hemostasis was noted. The round ligament   on the right was then grasped with bipolar cautery, cauterized and cut with   monopolar scissors. The anterior leaf of the broad ligament was then incised   along the bladder reflection to the midline and the uterine arteries were then   skeletonized, grasped with bipolar cautery, cauterized and transected with   monopolar scissors. Again, hemostasis was noted.     The procedure at this point went towards the patient's left. The left IP ligament   was grasped, cauterized and cut. Hemostasis was noted. The round ligament was   then grasped with bipolar cautery, cauterized and cut with monopolar scissors. The   anterior leaf of the broad ligament was then incised along the bladder reflection to   the midline and the uterine arteries were then skeletonized and grasped with bipolar   cautery, cauterized and transected with monopolar scissors. Again, hemostasis was   noted. The bladder was then gently dissected off the lower uterine segment and the cervix.     The colpotomy was made in a  circumferential fashion around the V-care ring using   monopolar scissors and entire specimen was removed vaginally. The pelvis was   then copiously irrigated and hemostasis was noted. The vaginal cuff was re-approximated   using Stratafix suture and transfixed to the ipsilateral cardinal and uterosacral ligaments.   Hemostasis was noted. Surgicel was then placed over the vaginal cuff as well as over the   pedicles.     All instruments were removed under direct visualization as was the CO2 gas. The robot was   de-docked and the trocars were removed.The skin at all ports were reapproximated with 4-0 vicryl.     Cystoscopy was then started. A 70-degree cystoscope was then placed into the   bladder under direct visualization. The bladder was noted to be intact with no injury,   foreign body or disease process. Ureteral jets were seen coming from both ureteral orifices.   The cystoscopy was completed and removed under direct visualization.     Sponge, needle, instrument, and lap counts were correct x2.     Patient tolerated the procedure well and went to recovery room in stable condition.   ____________________________________   KENDY ROSE MD 2/19/24

## 2024-02-19 NOTE — DISCHARGE INSTRUCTIONS
HOME CARE INSTRUCTIONS    ACTIVITY: Rest and take it easy for the first 24 hours.  A responsible adult is recommended to remain with you during that time.  It is normal to feel sleepy.  We encourage you to not do anything that requires balance, judgment or coordination.    FOR 24 HOURS DO NOT:  Drive, operate machinery or run household appliances.  Drink beer or alcoholic beverages.  Make important decisions or sign legal documents.    SPECIAL INSTRUCTIONS:     Pelvic rest x 8 weeks   F/U in 2 weeks   Call office for any problems     DIET: To avoid nausea, slowly advance diet as tolerated, avoiding spicy or greasy foods for the first day.  Add more substantial food to your diet according to your physician's instructions.  INCREASE FLUIDS AND FIBER TO AVOID CONSTIPATION.    SURGICAL DRESSING/BATHING: keep dressings clean dry and intact for two days. No submerging in water until cleared by MD.     MEDICATIONS: Resume taking daily medication.  Take prescribed pain medication with food.  If no medication is prescribed, you may take non-aspirin pain medication if needed.  PAIN MEDICATION CAN BE VERY CONSTIPATING.  Take a stool softener or laxative such as senokot, pericolace, or milk of magnesia if needed.    Prescription given for percocet.  Last pain medication given at 12:30 pm.    A follow-up appointment should be arranged with your doctor in 1-2 weeks; call to schedule.    You should CALL YOUR PHYSICIAN if you develop:  Fever greater than 101 degrees F.  Pain not relieved by medication, or persistent nausea or vomiting.  Excessive bleeding (blood soaking through dressing) or unexpected drainage from the wound.  Extreme redness or swelling around the incision site, drainage of pus or foul smelling drainage.  Inability to urinate or empty your bladder within 8 hours.  Problems with breathing or chest pain.    You should call 911 if you develop problems with breathing or chest pain.  If you are unable to contact your  doctor or surgical center, you should go to the nearest emergency room or urgent care center.    Physician's telephone #: [phone]    MILD FLU-LIKE SYMPTOMS ARE NORMAL.  YOU MAY EXPERIENCE GENERALIZED MUSCLE ACHES, THROAT IRRITATION, HEADACHE AND/OR SOME NAUSEA.    If any questions arise, call your doctor.  If your doctor is not available, please feel free to call the Surgical Center at [phone].  The Center is open Monday through Friday from 7AM to 7PM.      A registered nurse may call you a few days after your surgery to see how you are doing after your procedure.    You may also receive a survey in the mail within the next two weeks and we ask that you take a few moments to complete the survey and return it to us.  Our goal is to provide you with very good care and we value your comments.     Depression / Suicide Risk    As you are discharged from this [facility] facility, it is important to learn how to keep safe from harming yourself.    Recognize the warning signs:  Abrupt changes in personality, positive or negative- including increase in energy   Giving away possessions  Change in eating patterns- significant weight changes-  positive or negative  Change in sleeping patterns- unable to sleep or sleeping all the time   Unwillingness or inability to communicate  Depression  Unusual sadness, discouragement and loneliness  Talk of wanting to die  Neglect of personal appearance   Rebelliousness- reckless behavior  Withdrawal from people/activities they love  Confusion- inability to concentrate     If you or a loved one observes any of these behaviors or has concerns about self-harm, here's what you can do:  Talk about it- your feelings and reasons for harming yourself  Remove any means that you might use to hurt yourself (examples: pills, rope, extension cords, firearm)  Get professional help from the community (Mental Health, Substance Abuse, psychological counseling)  Do not be alone:Call your Safe  Contact- someone whom you trust who will be there for you.  Call your local CRISIS HOTLINE 855-0651 or 239-262-5551  Call your local Children's Mobile Crisis Response Team [place] (040) 182-7885 or www.[employer]  Call the toll free National Suicide Prevention Hotlines   National Suicide Prevention Lifeline 394-889-XXXG (0535)  [facility] Line Network 800-EGZJVRT (994-4448)    I acknowledge receipt and understanding of these Home Care instructions.

## 2024-02-19 NOTE — ANESTHESIA PROCEDURE NOTES
Airway    Date/Time: 2/19/2024 9:50 AM    Performed by: Glynn Garzon M.D.  Authorized by: Glynn Garzon M.D.    Location:  OR  Urgency:  Elective  Difficult Airway: No    Indications for Airway Management:  Anesthesia      Spontaneous Ventilation: absent    Sedation Level:  Deep  Preoxygenated: Yes    Patient Position:  Sniffing  Mask Difficulty Assessment:  1 - vent by mask  Final Airway Type:  Endotracheal airway  Final Endotracheal Airway:  ETT  Cuffed: Yes    Technique Used for Successful ETT Placement:  Direct laryngoscopy    Insertion Site:  Oral  Blade Type:  Chapa  Laryngoscope Blade/Videolaryngoscope Blade Size:  2  ETT Size (mm):  7.5  Measured from:  Teeth  ETT to Teeth (cm):  20  Placement Verified by: auscultation and capnometry    Cormack-Lehane Classification:  Grade I - full view of glottis  Number of Attempts at Approach:  1

## 2024-02-19 NOTE — ANESTHESIA POSTPROCEDURE EVALUATION
Patient: Sruthi Jenkins    Procedure Summary       Date: 02/19/24 Room / Location: [place] [address] / SURGERY [facility]    Anesthesia Start: 0941 Anesthesia Stop: 1220    Procedures:       ROBOTIC ASSISTED TOTAL LAPAROSCOPIC HYSTERECTOMY WITH BILATERAL SALPINGO OOPHORECTOMY (Bilateral: Abdomen)      CYSTOSCOPY (Bladder) Diagnosis: (ABNORMAL UTERINE BLEEDING, ANEMIA, COMPLEX HYPERPLASIA)    Surgeons: Taina Ivey M.D. Responsible Provider: Glynn Garzon M.D.    Anesthesia Type: general ASA Status: 2            Final Anesthesia Type: general  Last vitals  BP   Blood Pressure: 129/69    Temp   36.7 °C (98 °F)    Pulse   83   Resp   16    SpO2   100 %      Anesthesia Post Evaluation    Patient location during evaluation: PACU  Patient participation: complete - patient participated  Level of consciousness: awake and alert  Pain score: 2    Airway patency: patent  Anesthetic complications: no  Cardiovascular status: hemodynamically stable  Respiratory status: acceptable  Hydration status: euvolemic    PONV: none          There were no known notable events for this encounter.     Nurse Pain Score: 0 (NPRS)

## 2024-03-01 ENCOUNTER — HOSPITAL ENCOUNTER (EMERGENCY)
Facility: MEDICAL CENTER | Age: 49
End: 2024-03-01
Attending: STUDENT IN AN ORGANIZED HEALTH CARE EDUCATION/TRAINING PROGRAM
Payer: COMMERCIAL

## 2024-03-01 ENCOUNTER — APPOINTMENT (OUTPATIENT)
Dept: RADIOLOGY | Facility: MEDICAL CENTER | Age: 49
End: 2024-03-01
Attending: STUDENT IN AN ORGANIZED HEALTH CARE EDUCATION/TRAINING PROGRAM
Payer: COMMERCIAL

## 2024-03-01 VITALS
HEART RATE: 64 BPM | HEIGHT: 64 IN | OXYGEN SATURATION: 95 % | BODY MASS INDEX: 37.79 KG/M2 | WEIGHT: 221.34 LBS | SYSTOLIC BLOOD PRESSURE: 133 MMHG | TEMPERATURE: 98.1 F | RESPIRATION RATE: 18 BRPM | DIASTOLIC BLOOD PRESSURE: 63 MMHG

## 2024-03-01 DIAGNOSIS — G89.18 POST-OP PAIN: ICD-10-CM

## 2024-03-01 DIAGNOSIS — K52.9 ENTERITIS: ICD-10-CM

## 2024-03-01 LAB
ALBUMIN SERPL BCP-MCNC: 4.1 G/DL (ref 3.2–4.9)
ALBUMIN/GLOB SERPL: 1.5 G/DL
ALP SERPL-CCNC: 85 U/L (ref 30–99)
ALT SERPL-CCNC: 14 U/L (ref 2–50)
ANION GAP SERPL CALC-SCNC: 13 MMOL/L (ref 7–16)
ANISOCYTOSIS BLD QL SMEAR: ABNORMAL
AST SERPL-CCNC: 26 U/L (ref 12–45)
BASOPHILS # BLD AUTO: 0.9 % (ref 0–1.8)
BASOPHILS # BLD: 0.06 K/UL (ref 0–0.12)
BILIRUB SERPL-MCNC: 0.7 MG/DL (ref 0.1–1.5)
BUN SERPL-MCNC: 11 MG/DL (ref 8–22)
CALCIUM ALBUM COR SERPL-MCNC: 8.9 MG/DL (ref 8.5–10.5)
CALCIUM SERPL-MCNC: 9 MG/DL (ref 8.5–10.5)
CHLORIDE SERPL-SCNC: 104 MMOL/L (ref 96–112)
CO2 SERPL-SCNC: 22 MMOL/L (ref 20–33)
CREAT SERPL-MCNC: 0.73 MG/DL (ref 0.5–1.4)
EOSINOPHIL # BLD AUTO: 0.31 K/UL (ref 0–0.51)
EOSINOPHIL NFR BLD: 4.4 % (ref 0–6.9)
ERYTHROCYTE [DISTWIDTH] IN BLOOD BY AUTOMATED COUNT: 55.9 FL (ref 35.9–50)
GFR SERPLBLD CREATININE-BSD FMLA CKD-EPI: 102 ML/MIN/1.73 M 2
GLOBULIN SER CALC-MCNC: 2.8 G/DL (ref 1.9–3.5)
GLUCOSE SERPL-MCNC: 154 MG/DL (ref 65–99)
HCT VFR BLD AUTO: 30.5 % (ref 37–47)
HGB BLD-MCNC: 8.1 G/DL (ref 12–16)
HYPOCHROMIA BLD QL SMEAR: ABNORMAL
LIPASE SERPL-CCNC: 52 U/L (ref 11–82)
LYMPHOCYTES # BLD AUTO: 0.5 K/UL (ref 1–4.8)
LYMPHOCYTES NFR BLD: 7.1 % (ref 22–41)
MANUAL DIFF BLD: NORMAL
MCH RBC QN AUTO: 17.8 PG (ref 27–33)
MCHC RBC AUTO-ENTMCNC: 26.6 G/DL (ref 32.2–35.5)
MCV RBC AUTO: 67 FL (ref 81.4–97.8)
MICROCYTES BLD QL SMEAR: ABNORMAL
MONOCYTES # BLD AUTO: 0.18 K/UL (ref 0–0.85)
MONOCYTES NFR BLD AUTO: 2.6 % (ref 0–13.4)
MORPHOLOGY BLD-IMP: NORMAL
NEUTROPHILS # BLD AUTO: 6.03 K/UL (ref 1.82–7.42)
NEUTROPHILS NFR BLD: 85 % (ref 44–72)
NRBC # BLD AUTO: 0 K/UL
NRBC BLD-RTO: 0 /100 WBC (ref 0–0.2)
OVALOCYTES BLD QL SMEAR: NORMAL
PLATELET # BLD AUTO: 228 K/UL (ref 164–446)
PLATELET BLD QL SMEAR: NORMAL
POIKILOCYTOSIS BLD QL SMEAR: NORMAL
POTASSIUM SERPL-SCNC: 3.9 MMOL/L (ref 3.6–5.5)
PROT SERPL-MCNC: 6.9 G/DL (ref 6–8.2)
RBC # BLD AUTO: 4.55 M/UL (ref 4.2–5.4)
RBC BLD AUTO: PRESENT
SCHISTOCYTES BLD QL SMEAR: NORMAL
SODIUM SERPL-SCNC: 139 MMOL/L (ref 135–145)
WBC # BLD AUTO: 7.1 K/UL (ref 4.8–10.8)

## 2024-03-01 PROCEDURE — 700111 HCHG RX REV CODE 636 W/ 250 OVERRIDE (IP): Mod: JZ | Performed by: STUDENT IN AN ORGANIZED HEALTH CARE EDUCATION/TRAINING PROGRAM

## 2024-03-01 PROCEDURE — 83690 ASSAY OF LIPASE: CPT

## 2024-03-01 PROCEDURE — 36415 COLL VENOUS BLD VENIPUNCTURE: CPT

## 2024-03-01 PROCEDURE — 74177 CT ABD & PELVIS W/CONTRAST: CPT

## 2024-03-01 PROCEDURE — 700117 HCHG RX CONTRAST REV CODE 255: Performed by: STUDENT IN AN ORGANIZED HEALTH CARE EDUCATION/TRAINING PROGRAM

## 2024-03-01 PROCEDURE — 85007 BL SMEAR W/DIFF WBC COUNT: CPT

## 2024-03-01 PROCEDURE — 85027 COMPLETE CBC AUTOMATED: CPT

## 2024-03-01 PROCEDURE — 80053 COMPREHEN METABOLIC PANEL: CPT

## 2024-03-01 PROCEDURE — 99284 EMERGENCY DEPT VISIT MOD MDM: CPT

## 2024-03-01 PROCEDURE — 96374 THER/PROPH/DIAG INJ IV PUSH: CPT

## 2024-03-01 RX ORDER — NAPROXEN 375 MG/1
375 TABLET ORAL 2 TIMES DAILY WITH MEALS
Qty: 14 TABLET | Refills: 0 | Status: SHIPPED | OUTPATIENT
Start: 2024-03-01 | End: 2024-03-01

## 2024-03-01 RX ORDER — ONDANSETRON 4 MG/1
4 TABLET, ORALLY DISINTEGRATING ORAL EVERY 6 HOURS PRN
Qty: 10 TABLET | Refills: 0 | Status: SHIPPED | OUTPATIENT
Start: 2024-03-01

## 2024-03-01 RX ORDER — MORPHINE SULFATE 4 MG/ML
4 INJECTION INTRAVENOUS ONCE
Status: COMPLETED | OUTPATIENT
Start: 2024-03-01 | End: 2024-03-01

## 2024-03-01 RX ADMIN — MORPHINE SULFATE 4 MG: 4 INJECTION, SOLUTION INTRAMUSCULAR; INTRAVENOUS at 02:26

## 2024-03-01 RX ADMIN — IOHEXOL 100 ML: 350 INJECTION, SOLUTION INTRAVENOUS at 02:50

## 2024-03-01 ASSESSMENT — FIBROSIS 4 INDEX: FIB4 SCORE: 0.72

## 2024-03-01 ASSESSMENT — PAIN DESCRIPTION - PAIN TYPE: TYPE: ACUTE PAIN

## 2024-03-01 NOTE — ED TRIAGE NOTES
"Chief Complaint   Patient presents with    Abdominal Pain     Pt reports abdominal pain radiating to lower back x 2 days. Pt reports partial hysterectomy 02/19: states recovery was going well up until abd pain started. Denies any fevers, chills, N/V, or vaginal bleeding.        Pt to triage for above compliant. ABD protocol ordered. Pt reports last ED visit 01/29 for vaginal bleeding; PRBCs given at that time. Pt states she was advised to get hysterectomy during that time.     Pt placed in lobby and educated on triage process. Pt encouraged to alert staff for any changes, pt verbalized understanding. GCS 15.       BP (!) 144/74   Pulse 66   Temp (!) 35.8 °C (96.4 °F) (Temporal)   Resp 18   Ht 1.626 m (5' 4\")   Wt 100 kg (221 lb 5.5 oz)   LMP 01/31/2024   SpO2 100%   BMI 37.99 kg/m²         "

## 2024-03-01 NOTE — ED NOTES
Bedside report received from off going RN Wendy, assumed care of patient.  POC discussed with patient. Call light within reach, all needs addressed at this time.       Fall risk interventions in place: Move the patient closer to the nurse's station, Patient's personal possessions are with in their safe reach, and Keep floor surfaces clean and dry (all applicable per [place] Fall risk assessment)   Continuous monitoring: Pulse Ox or Blood Pressure  IVF/IV medications: Not Applicable   Oxygen: Room Air  Bedside sitter: Not Applicable   Isolation: Not Applicable

## 2024-03-01 NOTE — ED PROVIDER NOTES
ED Provider Note    CHIEF COMPLAINT  Chief Complaint   Patient presents with    Abdominal Pain     Pt reports abdominal pain radiating to lower back x 2 days. Pt reports partial hysterectomy : states recovery was going well up until abd pain started. Denies any fevers, chills, N/V, or vaginal bleeding.        EXTERNAL RECORDS REVIEWED  Inpatient Notes surgical note on 2024 for a total hysterectomy    HPI/ROS  LIMITATION TO HISTORY   Select: : None  OUTSIDE HISTORIAN(S):    Sruthi Jenkins is a 48 y.o. female who presents with mid abdominal pain rating to the back for the past 2 days.  Patient reports that she was feeling well after her hysterectomy on 219.  Patient reports that she is not having any nausea or vomiting.  Patient reports pain is dull.  Patient denies dysuria or hematuria.  Patient denies fevers chills body aches or sweats.  Patient denies vaginal bleeding.    PAST MEDICAL HISTORY   has a past medical history of Anemia, Diabetes (HCC), Gynecological disorder, and Heart burn.    SURGICAL HISTORY   has a past surgical history that includes dilation and curettage (N/A, 3/31/2023); cystourethroscopy (N/A, 2024); and hysterectomy robotic xi (Bilateral, 2024).    FAMILY HISTORY  History reviewed. No pertinent family history.    SOCIAL HISTORY  Social History     Tobacco Use    Smoking status: Former     Current packs/day: 0.00     Average packs/day: 0.5 packs/day for 10.0 years (5.0 ttl pk-yrs)     Types: Cigarettes     Start date: 10/1/2012     Quit date: 10/1/2022     Years since quittin.4    Smokeless tobacco: Never   Vaping Use    Vaping Use: Never used   Substance and Sexual Activity    Alcohol use: Not Currently     Alcohol/week: 0.6 oz     Types: 1 Glasses of wine per week     Comment: 1-2 a glass of wine / week    Drug use: No    Sexual activity: Not on file       CURRENT MEDICATIONS  Home Medications       Reviewed by Leta Kim R.N. (Registered Nurse) on  "03/01/24 at 0124  Med List Status: Partial     Medication Last Dose Status   docusate sodium (COLACE) 100 MG Cap  Active   ferrous sulfate 324 (65 Fe) MG Tablet Delayed Response EC tablet  Active   ibuprofen (MOTRIN) 800 MG Tab  Active                    ALLERGIES  No Known Allergies    PHYSICAL EXAM  VITAL SIGNS: /63   Pulse 64   Temp 36.7 °C (98.1 °F) (Temporal)   Resp 18   Ht 1.626 m (5' 4\")   Wt 100 kg (221 lb 5.5 oz)   LMP 01/31/2024   SpO2 95%   BMI 37.99 kg/m²    Vitals and nursing note reviewed.   Constitutional:       Comments: Patient is lying in bed supine, pleasant, conversant, speaking in complete sentences   HENT:      Head: Normocephalic and atraumatic.   Eyes:      Extraocular Movements: Extraocular movements intact.      Conjunctiva/sclera: Conjunctivae normal.      Pupils: Pupils are equal, round, and reactive to light.   Cardiovascular:      Pulses: Normal pulses.      Comments: HR 66  Pulmonary:      Effort: Pulmonary effort is normal. No respiratory distress.   Abdominal:      Comments: Abdomen is soft, periumbilical tenderness to palpation, no rigidity, rebound or guarding, well-healing trocar scars  Musculoskeletal:         General: No swelling. Normal range of motion.      Cervical back: Normal range of motion. No rigidity.   Skin:     General: Skin is warm and dry.      Capillary Refill: Capillary refill takes less than 2 seconds.   Neurological:      Mental Status: Alert.       DIAGNOSTIC STUDIES / PROCEDURES    LABS  No leukocytosis    RADIOLOGY  I have independently interpreted the diagnostic imaging associated with this visit and am waiting the final reading from the radiologist.   My preliminary interpretation is as follows: No perforation  Radiologist interpretation: Enteritis    COURSE & MEDICAL DECISION MAKING    INITIAL ASSESSMENT, COURSE AND PLAN  Care Narrative: CT abdomen pelvis to evaluate for postoperative infection, small bowel obstruction, hemorrhage or other " acute intra-abdominal process.  CBC to evaluate for acute anemia and leukocytosis.  CMP to evaluate for acute electrolyte abnormality, acute kidney injury, acute liver failure or dysfunction.  Urinalysis to evaluate for UTI.  Lipase to evaluate for pancreatitis.  Morphine for pain control.  Disposition pending workup.    Electronically signed by: Isaias Muller M.D., 3/1/2024 2:21 AM    CT imaging demonstrates evidence of enteritis, no small bowel obstruction, cholelithiasis.  Patient with baseline anemia, and patient denies black or bloody stools, no elevation in BUN, acute GI bleeding consistent with patient presentation at this time. CMP demonstrates no evidence of acute kidney injury, acute electrolyte abnormality, acute liver failure, CBC demonstrates no evidence of leukocytosis.    Repeat physical exam benign.  I doubt any serious emergency process at this time.  Patient and/or family, friends given strict return precautions for worsening symptoms and care instructions. They have demonstrated understanding of discharge instructions through teach back mechanism. Advised PCP follow-up in 1-2 days.  Patient/family/friend expresses understanding and agrees to plan.    This dictation has been created using voice recognition software. I am continuously working with the software to minimize the number of voice recognition errors and I have made every attempt to manually correct the errors within my dictation. However errors  related to this voice recognition software may still exist and should be interpreted within the appropriate context.     Electronically signed by: Isaias Muller M.D., 3/1/2024 6:17 AM    DISPOSITION AND DISCUSSIONS    Escalation of care considered, and ultimately not performed:acute inpatient care management, however at this time, the patient is most appropriate for outpatient management    Barriers to care at this time, including but not limited to: Patient does not have  established PCP.     Decision tools and prescription drugs considered including, but not limited to: Pain Medications   over-the-counter pain medications are appropriate, narcotics not indicated at this time  .    FINAL DIAGNOSIS  1. Post-op pain    2. Enteritis           Electronically signed by: Isaias Muller M.D., 3/1/2024 2:20 AM

## 2024-06-14 ENCOUNTER — HOSPITAL ENCOUNTER (OUTPATIENT)
Dept: RADIOLOGY | Facility: MEDICAL CENTER | Age: 49
End: 2024-06-14
Attending: SPECIALIST
Payer: COMMERCIAL

## 2024-06-14 DIAGNOSIS — Z48.816 AFTERCARE FOLLOWING SURGERY OF THE GENITOURINARY SYSTEM: ICD-10-CM

## 2024-06-14 DIAGNOSIS — C54.1 ENDOMETRIAL SARCOMA (HCC): ICD-10-CM

## 2024-06-14 DIAGNOSIS — K21.9 GASTROESOPHAGEAL REFLUX DISEASE, UNSPECIFIED WHETHER ESOPHAGITIS PRESENT: ICD-10-CM

## 2024-06-14 PROCEDURE — 77080 DXA BONE DENSITY AXIAL: CPT

## (undated) DEVICE — BRIEF STRETCH MATERNITY M/L - FITS 20-60IN (5EA/BG 20BG/CA)

## (undated) DEVICE — TOWEL STOP TIMEOUT SAFETY FLAG (40EA/CA)

## (undated) DEVICE — TUBING CLEARLINK DUO-VENT - C-FLO (48EA/CA)

## (undated) DEVICE — SODIUM CHL. IRRIGATION 0.9% 3000ML (4EA/CA 65CA/PF)

## (undated) DEVICE — UTERINE MANIP V-CARE LARGE - (DAVINCI)  8/CA

## (undated) DEVICE — SODIUM CHL. INJ. 0.9% 500ML (24EA/CA 50CA/PF)

## (undated) DEVICE — GLOVE BIOGEL SZ 6 PF LATEX - (50EA/BX 4BX/CA)

## (undated) DEVICE — SEAL 5MM-8MM UNIVERSAL  BOX OF 10

## (undated) DEVICE — CANISTER SUCTION 3000ML MECHANICAL FILTER AUTO SHUTOFF MEDI-VAC NONSTERILE LF DISP  (40EA/CA)

## (undated) DEVICE — ADHESIVE DERMABOND HVD MINI (12EA/BX)

## (undated) DEVICE — GOWN SURGEONS X-LARGE - DISP. (30/CA)

## (undated) DEVICE — FORCEPS FENESTRATED BIPOLAR (14UN/EA)

## (undated) DEVICE — SET SUCTION/IRRIGATION WITH DISPOSABLE TIP (6/CA )PART #0250-070-520 IS A SUB

## (undated) DEVICE — HEMOSTAT ABSORBABLE POWDER SURGICEL 3G (5EA/BX)

## (undated) DEVICE — ARMREST CRADLE FOAM - (2PR/PK 12PR/CA)

## (undated) DEVICE — SUTURE 2-0 30CM STRATAFIX SPIRAL PDO ***WAS PART #SXPD1B401 *****

## (undated) DEVICE — SYSTEM CLEARIFY VISUALIZATION (10EA/PK)

## (undated) DEVICE — Device

## (undated) DEVICE — OBTURATOR BLADELESS STANDARD 8MM (6EA/BX)

## (undated) DEVICE — LACTATED RINGERS INJ 1000 ML - (14EA/CA 60CA/PF)

## (undated) DEVICE — JELLY SURGILUBE STERILE FOIL 5 GM (150EA/BX)

## (undated) DEVICE — PAD SANITARY 11IN MAXI IND WRAPPED  (12EA/PK 24PK/CA)

## (undated) DEVICE — WATER IRRIG. STER 3000 ML - (4/CA)

## (undated) DEVICE — SET LEADWIRE 5 LEAD BEDSIDE DISPOSABLE ECG (1SET OF 5/EA)

## (undated) DEVICE — ELECTRODE DUAL RETURN W/ CORD - (50/PK)

## (undated) DEVICE — SET TUBING AQUILEX IN-FLOW MYOSURE (10EA/BX)

## (undated) DEVICE — COVER TIP ENDOWRIST HOT SHEAR - (10EA/BX) DA VINCI

## (undated) DEVICE — APPLICATOR ENDOSCOPIC SURGICEL (5EA/BX)

## (undated) DEVICE — PACK GYN DAVINCI (1EA/CA)

## (undated) DEVICE — DRESSING NON ADHERENT 3 X 4 - STERILE (100/BX 12BX/CA)

## (undated) DEVICE — GLOVE BIOGEL PI INDICATOR SZ 7.0 SURGICAL PF LF - (50/BX 4BX/CA)

## (undated) DEVICE — CANNULA W/ SUPPLY TUBING O2 - (50/CA)

## (undated) DEVICE — SUCTION INSTRUMENT YANKAUER BULBOUS TIP W/O VENT (50EA/CA)

## (undated) DEVICE — SLEEVE VASO CALF MED - (10PR/CA)

## (undated) DEVICE — SET TUBING AQUILEX OUT-FLOW MYOSURE (10EA/BX)

## (undated) DEVICE — COVER LIGHT HANDLE ALC PLUS DISP (18EA/BX)

## (undated) DEVICE — DRAPE ARM  BOX OF 20

## (undated) DEVICE — SUTURE GENERAL

## (undated) DEVICE — NEEDLE DRIVER MEGA SUTURECUT DA VINCI 15X'S REUSABLE

## (undated) DEVICE — SET EXTENSION WITH 2 PORTS (48EA/CA) ***PART #2C8610 IS A SUBSTITUTE*****

## (undated) DEVICE — MASK OXYGEN VNYL ADLT MED CONC WITH 7 FOOT TUBING  - (50EA/CA)

## (undated) DEVICE — SUTURE 4-0 VICRYL PLUS FS-2 - 27 INCH (36/BX)

## (undated) DEVICE — DRAPE COLUMN  BOX OF 20

## (undated) DEVICE — NEEDLE INSUFFLATION FOR STEP - (12/BX)

## (undated) DEVICE — GOWN WARMING STANDARD FLEX - (30/CA)

## (undated) DEVICE — KIT HTA GENESYS - (5/BX)

## (undated) DEVICE — TRAY SRGPRP PVP IOD WT PRP - (20/CA)

## (undated) DEVICE — SUTURE 0 VICRYL PLUS UR-6 - 27 INCH (36/BX)

## (undated) DEVICE — PACK TRENGUARD 450 PROCEDURE (12EA/CA)

## (undated) DEVICE — SHEARS MONOPOLAR CURVED  DA VINCI 10X'S REUSABLE

## (undated) DEVICE — SODIUM CHL IRRIGATION 0.9% 1000ML (12EA/CA)

## (undated) DEVICE — GLOVE SZ 7 BIOGEL PI MICRO - PF LF (50PR/BX 4BX/CA)

## (undated) DEVICE — PAD OR TABLE DA VINCI 2IN X 20IN X 72IN - (12EA/CA)

## (undated) DEVICE — SUTURE 0 VICRYL PLUS CT-2 - 27 INCH (36/BX)

## (undated) DEVICE — SENSOR OXIMETER ADULT SPO2 RD SET (20EA/BX)

## (undated) DEVICE — KIT  I.V. START (100EA/CA)

## (undated) DEVICE — TROCAR Z THREAD 12 X 100 - BLADED (6/BX)

## (undated) DEVICE — CATHETER FOLEY ROBINSON 14FR 16IN STRL (12EA/CA)